# Patient Record
Sex: FEMALE | Race: WHITE | Employment: OTHER | URBAN - METROPOLITAN AREA
[De-identification: names, ages, dates, MRNs, and addresses within clinical notes are randomized per-mention and may not be internally consistent; named-entity substitution may affect disease eponyms.]

---

## 2017-04-10 ENCOUNTER — HOSPITAL ENCOUNTER (OUTPATIENT)
Dept: MAMMOGRAPHY | Age: 82
Discharge: HOME | End: 2017-04-10
Attending: INTERNAL MEDICINE
Payer: MEDICARE

## 2017-04-10 DIAGNOSIS — Z12.31 VISIT FOR SCREENING MAMMOGRAM: ICD-10-CM

## 2017-04-10 PROCEDURE — 77067 SCR MAMMO BI INCL CAD: CPT

## 2017-07-19 ENCOUNTER — HOSPITAL ENCOUNTER (OUTPATIENT)
Dept: GENERAL RADIOLOGY | Age: 82
Discharge: HOME | End: 2017-07-19
Attending: INTERNAL MEDICINE
Payer: MEDICARE

## 2017-07-19 DIAGNOSIS — M25.551 RIGHT HIP PAIN: ICD-10-CM

## 2017-07-19 PROCEDURE — 73502 X-RAY EXAM HIP UNI 2-3 VIEWS: CPT

## 2018-04-11 ENCOUNTER — HOSPITAL ENCOUNTER (OUTPATIENT)
Dept: MAMMOGRAPHY | Age: 83
Discharge: HOME | End: 2018-04-11
Attending: INTERNAL MEDICINE
Payer: MEDICARE

## 2018-04-11 DIAGNOSIS — Z12.39 SCREENING BREAST EXAMINATION: ICD-10-CM

## 2018-04-11 PROCEDURE — 77067 SCR MAMMO BI INCL CAD: CPT

## 2018-11-15 ENCOUNTER — HOSPITAL ENCOUNTER (OUTPATIENT)
Dept: ULTRASOUND IMAGING | Age: 83
Discharge: HOME | End: 2018-11-15
Attending: INTERNAL MEDICINE
Payer: MEDICARE

## 2018-11-15 ENCOUNTER — HOSPITAL ENCOUNTER (OUTPATIENT)
Dept: VASCULAR SURGERY | Age: 83
Discharge: HOME | End: 2018-11-15
Attending: INTERNAL MEDICINE
Payer: MEDICARE

## 2018-11-15 DIAGNOSIS — I10 HTN (HYPERTENSION): ICD-10-CM

## 2018-11-15 DIAGNOSIS — I12.9 RENAL HYPERTENSION: ICD-10-CM

## 2018-11-15 PROCEDURE — 93975 VASCULAR STUDY: CPT

## 2018-11-15 PROCEDURE — 76770 US EXAM ABDO BACK WALL COMP: CPT

## 2018-11-15 NOTE — PROCEDURES
[address]  *** FINAL REPORT ***    Name: Kelsey Nguyen  MRN: [id number]    Outpatient  : 08 May 1924  HIS Order #: [id number]  [id number] [facility] Visit #: [id number]  Date: 15 Nov 2018    TYPE OF TEST: Visceral Arterial Duplex    REASON FOR TEST  Eval for renal vascular HTN    Aortic PSV: 108.3 cm/s  Diameter AP: 1.5 cm   TV: 1.5 cm                   Right          Left  Renal Artery:- -------------  -------------  Proximal  PSV:  Mid       PSV:  Distal    PSV:  Aortic ratio :    Medullary PSV:            EDV:            EDR:            SDR:    Cortical  PSV:            EDV:            EDR:            SDR:  Stenosis:  Kidney size:    9.1 cm             cm               x  4.3 cm      x      cm    Hilar:-        Right          Left  Acc. Time  AT:     secs           secs  Acc. Index AI:             RI: 0.71    Mesenteric:-                  Prox   Mid   Dist Ratio Stenosis          Aneurysm                  ----- ----- ----- ----- ----------------- ------------  SMA:  Celiac:  Hepatic:  Splenic:  LUPILLO:  :    INTERPRETATION/FINDINGS  PROCEDURE: Evaluation of renal arteries with ultrasound (B-mode  imaging, pulsed Doppler, color Doppler). Includes the aorta, celiac  artery, superior mesenteric artery, renal arteries, segmental  arteries, and renal vein. IMPRESSION: Unable to visualize the left kidney due to bowel gas. Technically difficult study due to patient body habitus and bowel gas. 1. Note: High aortic PSV invalidates the use of RAR as an indicator of   renal stenosis. RENAL:  1. The right kidney measures 9.1 cm. MESENTERIC: Unable to visualzie the Celiac or SMA due to bowel gas and   patient body habitus. ADDITIONAL COMMENTS    I have personally reviewed the data relevant to the interpretation of  this  study. TECHNOLOGIST: Percy Trinh RVT  Signed: 11/15/2018 05:51 PM    PHYSICIAN: Sarah Jorge.  Phi Farah MD  Signed: 2018 08:23 AM

## 2019-04-03 ENCOUNTER — APPOINTMENT (OUTPATIENT)
Dept: CT IMAGING | Age: 84
DRG: 391 | End: 2019-04-03
Attending: STUDENT IN AN ORGANIZED HEALTH CARE EDUCATION/TRAINING PROGRAM
Payer: MEDICARE

## 2019-04-03 ENCOUNTER — HOSPITAL ENCOUNTER (INPATIENT)
Age: 84
LOS: 6 days | Discharge: NURSING, REHAB OR LONG-TERM CARE | DRG: 391 | End: 2019-04-09
Attending: STUDENT IN AN ORGANIZED HEALTH CARE EDUCATION/TRAINING PROGRAM | Admitting: FAMILY MEDICINE
Payer: MEDICARE

## 2019-04-03 DIAGNOSIS — E86.0 DEHYDRATION: ICD-10-CM

## 2019-04-03 DIAGNOSIS — R10.84 ABDOMINAL PAIN, GENERALIZED: ICD-10-CM

## 2019-04-03 DIAGNOSIS — I10 ESSENTIAL HYPERTENSION: ICD-10-CM

## 2019-04-03 DIAGNOSIS — R60.9 EDEMA, UNSPECIFIED TYPE: ICD-10-CM

## 2019-04-03 DIAGNOSIS — K56.7 ILEUS (HCC): Primary | ICD-10-CM

## 2019-04-03 PROBLEM — R10.9 ABDOMINAL PAIN: Status: ACTIVE | Noted: 2019-04-03

## 2019-04-03 PROBLEM — R10.13 ABDOMINAL PAIN, EPIGASTRIC: Status: ACTIVE | Noted: 2019-04-03

## 2019-04-03 LAB
ALBUMIN SERPL-MCNC: 3.3 G/DL (ref 3.5–5)
ALBUMIN/GLOB SERPL: 1.1 {RATIO} (ref 1.1–2.2)
ALP SERPL-CCNC: 122 U/L (ref 45–117)
ALT SERPL-CCNC: 15 U/L (ref 12–78)
ANION GAP SERPL CALC-SCNC: 9 MMOL/L (ref 5–15)
APPEARANCE UR: CLEAR
AST SERPL-CCNC: 22 U/L (ref 15–37)
BACTERIA URNS QL MICRO: NEGATIVE /HPF
BASOPHILS # BLD: 0.1 K/UL (ref 0–0.1)
BASOPHILS NFR BLD: 1 % (ref 0–1)
BILIRUB SERPL-MCNC: 0.4 MG/DL (ref 0.2–1)
BILIRUB UR QL: NEGATIVE
BUN SERPL-MCNC: 27 MG/DL (ref 6–20)
BUN/CREAT SERPL: 30 (ref 12–20)
CALCIUM SERPL-MCNC: 8.8 MG/DL (ref 8.5–10.1)
CHLORIDE SERPL-SCNC: 107 MMOL/L (ref 97–108)
CO2 SERPL-SCNC: 26 MMOL/L (ref 21–32)
COLOR UR: ABNORMAL
COMMENT, HOLDF: NORMAL
CREAT SERPL-MCNC: 0.89 MG/DL (ref 0.55–1.02)
DIFFERENTIAL METHOD BLD: ABNORMAL
EOSINOPHIL # BLD: 0 K/UL (ref 0–0.4)
EOSINOPHIL NFR BLD: 0 % (ref 0–7)
EPITH CASTS URNS QL MICRO: ABNORMAL /LPF
ERYTHROCYTE [DISTWIDTH] IN BLOOD BY AUTOMATED COUNT: 16.3 % (ref 11.5–14.5)
GLOBULIN SER CALC-MCNC: 3.1 G/DL (ref 2–4)
GLUCOSE SERPL-MCNC: 114 MG/DL (ref 65–100)
GLUCOSE UR STRIP.AUTO-MCNC: NEGATIVE MG/DL
HCT VFR BLD AUTO: 30.6 % (ref 35–47)
HGB BLD-MCNC: 9.3 G/DL (ref 11.5–16)
HGB UR QL STRIP: NEGATIVE
HYALINE CASTS URNS QL MICRO: ABNORMAL /LPF (ref 0–5)
IMM GRANULOCYTES # BLD AUTO: 0 K/UL (ref 0–0.04)
IMM GRANULOCYTES NFR BLD AUTO: 0 % (ref 0–0.5)
KETONES UR QL STRIP.AUTO: 15 MG/DL
LACTATE SERPL-SCNC: 0.8 MMOL/L (ref 0.4–2)
LEUKOCYTE ESTERASE UR QL STRIP.AUTO: NEGATIVE
LIPASE SERPL-CCNC: 112 U/L (ref 73–393)
LYMPHOCYTES # BLD: 0.7 K/UL (ref 0.8–3.5)
LYMPHOCYTES NFR BLD: 7 % (ref 12–49)
MAGNESIUM SERPL-MCNC: 1.9 MG/DL (ref 1.6–2.4)
MCH RBC QN AUTO: 25.7 PG (ref 26–34)
MCHC RBC AUTO-ENTMCNC: 30.4 G/DL (ref 30–36.5)
MCV RBC AUTO: 84.5 FL (ref 80–99)
MONOCYTES # BLD: 0.3 K/UL (ref 0–1)
MONOCYTES NFR BLD: 3 % (ref 5–13)
NEUTS SEG # BLD: 8.8 K/UL (ref 1.8–8)
NEUTS SEG NFR BLD: 89 % (ref 32–75)
NITRITE UR QL STRIP.AUTO: NEGATIVE
NRBC # BLD: 0 K/UL (ref 0–0.01)
NRBC BLD-RTO: 0 PER 100 WBC
PH UR STRIP: 5.5 [PH] (ref 5–8)
PLATELET # BLD AUTO: 247 K/UL (ref 150–400)
PMV BLD AUTO: 10.4 FL (ref 8.9–12.9)
POTASSIUM SERPL-SCNC: 3.3 MMOL/L (ref 3.5–5.1)
PROT SERPL-MCNC: 6.4 G/DL (ref 6.4–8.2)
PROT UR STRIP-MCNC: 100 MG/DL
RBC # BLD AUTO: 3.62 M/UL (ref 3.8–5.2)
RBC #/AREA URNS HPF: ABNORMAL /HPF (ref 0–5)
RBC MORPH BLD: ABNORMAL
SAMPLES BEING HELD,HOLD: NORMAL
SODIUM SERPL-SCNC: 142 MMOL/L (ref 136–145)
SP GR UR REFRACTOMETRY: 1.02 (ref 1–1.03)
UROBILINOGEN UR QL STRIP.AUTO: 0.2 EU/DL (ref 0.2–1)
WBC # BLD AUTO: 9.9 K/UL (ref 3.6–11)
WBC URNS QL MICRO: ABNORMAL /HPF (ref 0–4)

## 2019-04-03 PROCEDURE — 83605 ASSAY OF LACTIC ACID: CPT

## 2019-04-03 PROCEDURE — 99285 EMERGENCY DEPT VISIT HI MDM: CPT

## 2019-04-03 PROCEDURE — 65270000032 HC RM SEMIPRIVATE

## 2019-04-03 PROCEDURE — 81001 URINALYSIS AUTO W/SCOPE: CPT

## 2019-04-03 PROCEDURE — 74011000250 HC RX REV CODE- 250: Performed by: STUDENT IN AN ORGANIZED HEALTH CARE EDUCATION/TRAINING PROGRAM

## 2019-04-03 PROCEDURE — 96374 THER/PROPH/DIAG INJ IV PUSH: CPT

## 2019-04-03 PROCEDURE — 83735 ASSAY OF MAGNESIUM: CPT

## 2019-04-03 PROCEDURE — 96375 TX/PRO/DX INJ NEW DRUG ADDON: CPT

## 2019-04-03 PROCEDURE — 96361 HYDRATE IV INFUSION ADD-ON: CPT

## 2019-04-03 PROCEDURE — 74011000258 HC RX REV CODE- 258: Performed by: RADIOLOGY

## 2019-04-03 PROCEDURE — 80053 COMPREHEN METABOLIC PANEL: CPT

## 2019-04-03 PROCEDURE — 83690 ASSAY OF LIPASE: CPT

## 2019-04-03 PROCEDURE — 51701 INSERT BLADDER CATHETER: CPT

## 2019-04-03 PROCEDURE — 74011636320 HC RX REV CODE- 636/320: Performed by: RADIOLOGY

## 2019-04-03 PROCEDURE — 36415 COLL VENOUS BLD VENIPUNCTURE: CPT

## 2019-04-03 PROCEDURE — 74011250637 HC RX REV CODE- 250/637: Performed by: FAMILY MEDICINE

## 2019-04-03 PROCEDURE — 85025 COMPLETE CBC W/AUTO DIFF WBC: CPT

## 2019-04-03 PROCEDURE — 74011250636 HC RX REV CODE- 250/636: Performed by: STUDENT IN AN ORGANIZED HEALTH CARE EDUCATION/TRAINING PROGRAM

## 2019-04-03 PROCEDURE — 74177 CT ABD & PELVIS W/CONTRAST: CPT

## 2019-04-03 PROCEDURE — 74011000258 HC RX REV CODE- 258: Performed by: FAMILY MEDICINE

## 2019-04-03 PROCEDURE — 74011250637 HC RX REV CODE- 250/637: Performed by: STUDENT IN AN ORGANIZED HEALTH CARE EDUCATION/TRAINING PROGRAM

## 2019-04-03 PROCEDURE — 77030005563 HC CATH URETH INT MMGH -A

## 2019-04-03 PROCEDURE — 74011250636 HC RX REV CODE- 250/636: Performed by: FAMILY MEDICINE

## 2019-04-03 RX ORDER — SODIUM CHLORIDE 0.9 % (FLUSH) 0.9 %
5-40 SYRINGE (ML) INJECTION EVERY 8 HOURS
Status: DISCONTINUED | OUTPATIENT
Start: 2019-04-03 | End: 2019-04-09 | Stop reason: HOSPADM

## 2019-04-03 RX ORDER — MORPHINE SULFATE 2 MG/ML
2 INJECTION, SOLUTION INTRAMUSCULAR; INTRAVENOUS
Status: COMPLETED | OUTPATIENT
Start: 2019-04-03 | End: 2019-04-03

## 2019-04-03 RX ORDER — ACETAMINOPHEN 325 MG/1
650 TABLET ORAL ONCE
Status: COMPLETED | OUTPATIENT
Start: 2019-04-03 | End: 2019-04-03

## 2019-04-03 RX ORDER — LEVOTHYROXINE SODIUM 50 UG/1
50 TABLET ORAL
Status: DISCONTINUED | OUTPATIENT
Start: 2019-04-04 | End: 2019-04-09 | Stop reason: HOSPADM

## 2019-04-03 RX ORDER — TEMAZEPAM 15 MG/1
15 CAPSULE ORAL
Status: DISCONTINUED | OUTPATIENT
Start: 2019-04-03 | End: 2019-04-09 | Stop reason: HOSPADM

## 2019-04-03 RX ORDER — SODIUM CHLORIDE 9 MG/ML
50 INJECTION, SOLUTION INTRAVENOUS CONTINUOUS
Status: DISCONTINUED | OUTPATIENT
Start: 2019-04-03 | End: 2019-04-05

## 2019-04-03 RX ORDER — GUAIFENESIN 100 MG/5ML
81 LIQUID (ML) ORAL DAILY
Status: DISCONTINUED | OUTPATIENT
Start: 2019-04-04 | End: 2019-04-09 | Stop reason: HOSPADM

## 2019-04-03 RX ORDER — PANTOPRAZOLE SODIUM 40 MG/1
40 TABLET, DELAYED RELEASE ORAL
Status: DISCONTINUED | OUTPATIENT
Start: 2019-04-04 | End: 2019-04-09 | Stop reason: HOSPADM

## 2019-04-03 RX ORDER — ESCITALOPRAM OXALATE 10 MG/1
10 TABLET ORAL DAILY
Status: DISCONTINUED | OUTPATIENT
Start: 2019-04-04 | End: 2019-04-09 | Stop reason: HOSPADM

## 2019-04-03 RX ORDER — SODIUM CHLORIDE 0.9 % (FLUSH) 0.9 %
10 SYRINGE (ML) INJECTION
Status: COMPLETED | OUTPATIENT
Start: 2019-04-03 | End: 2019-04-03

## 2019-04-03 RX ORDER — ATORVASTATIN CALCIUM 40 MG/1
40 TABLET, FILM COATED ORAL
Status: DISCONTINUED | OUTPATIENT
Start: 2019-04-03 | End: 2019-04-09 | Stop reason: HOSPADM

## 2019-04-03 RX ORDER — LOSARTAN POTASSIUM 50 MG/1
100 TABLET ORAL DAILY
Status: DISCONTINUED | OUTPATIENT
Start: 2019-04-03 | End: 2019-04-09 | Stop reason: HOSPADM

## 2019-04-03 RX ORDER — OXYBUTYNIN CHLORIDE 5 MG/1
5 TABLET ORAL
Status: DISCONTINUED | OUTPATIENT
Start: 2019-04-03 | End: 2019-04-09 | Stop reason: HOSPADM

## 2019-04-03 RX ORDER — CLONIDINE 0.1 MG/24H
1 PATCH, EXTENDED RELEASE TRANSDERMAL
Status: DISCONTINUED | OUTPATIENT
Start: 2019-04-03 | End: 2019-04-04

## 2019-04-03 RX ORDER — ONDANSETRON 2 MG/ML
4 INJECTION INTRAMUSCULAR; INTRAVENOUS
Status: COMPLETED | OUTPATIENT
Start: 2019-04-03 | End: 2019-04-03

## 2019-04-03 RX ORDER — POTASSIUM CHLORIDE 750 MG/1
20 TABLET, FILM COATED, EXTENDED RELEASE ORAL
Status: COMPLETED | OUTPATIENT
Start: 2019-04-03 | End: 2019-04-03

## 2019-04-03 RX ORDER — SODIUM CHLORIDE 0.9 % (FLUSH) 0.9 %
5-40 SYRINGE (ML) INJECTION AS NEEDED
Status: DISCONTINUED | OUTPATIENT
Start: 2019-04-03 | End: 2019-04-09 | Stop reason: HOSPADM

## 2019-04-03 RX ADMIN — POTASSIUM CHLORIDE 20 MEQ: 750 TABLET, EXTENDED RELEASE ORAL at 19:48

## 2019-04-03 RX ADMIN — SODIUM CHLORIDE 50 ML/HR: 900 INJECTION, SOLUTION INTRAVENOUS at 19:35

## 2019-04-03 RX ADMIN — FAMOTIDINE 20 MG: 10 INJECTION, SOLUTION INTRAVENOUS at 10:46

## 2019-04-03 RX ADMIN — Medication 10 ML: at 21:29

## 2019-04-03 RX ADMIN — IOPAMIDOL 100 ML: 755 INJECTION, SOLUTION INTRAVENOUS at 12:28

## 2019-04-03 RX ADMIN — LOSARTAN POTASSIUM 100 MG: 50 TABLET ORAL at 18:23

## 2019-04-03 RX ADMIN — SODIUM CHLORIDE 100 ML: 900 INJECTION, SOLUTION INTRAVENOUS at 12:29

## 2019-04-03 RX ADMIN — CEFTRIAXONE 1 G: 1 INJECTION, POWDER, FOR SOLUTION INTRAMUSCULAR; INTRAVENOUS at 19:35

## 2019-04-03 RX ADMIN — SODIUM CHLORIDE 1000 ML: 900 INJECTION, SOLUTION INTRAVENOUS at 10:46

## 2019-04-03 RX ADMIN — OXYBUTYNIN CHLORIDE 5 MG: 5 TABLET ORAL at 21:28

## 2019-04-03 RX ADMIN — ATORVASTATIN CALCIUM 40 MG: 40 TABLET, FILM COATED ORAL at 21:29

## 2019-04-03 RX ADMIN — MORPHINE SULFATE 2 MG: 2 INJECTION, SOLUTION INTRAMUSCULAR; INTRAVENOUS at 13:21

## 2019-04-03 RX ADMIN — Medication 10 ML: at 12:28

## 2019-04-03 RX ADMIN — ONDANSETRON 4 MG: 2 INJECTION INTRAMUSCULAR; INTRAVENOUS at 10:46

## 2019-04-03 RX ADMIN — TEMAZEPAM 15 MG: 15 CAPSULE ORAL at 21:28

## 2019-04-03 RX ADMIN — ACETAMINOPHEN 650 MG: 325 TABLET ORAL at 10:45

## 2019-04-03 NOTE — ED NOTES
Patient reports decreased pain. Resting on stretcher with eyes closed, updated on plan of care for admission. Call bell within reach.

## 2019-04-03 NOTE — ED NOTES
Patient transported to floor via stretcher by transport team. Patient in stable condition, alert and oriented.

## 2019-04-03 NOTE — ED NOTES
Patient resting with eyes closed on stretcher in NAD. Patient's friend updated on plan of care and CT results. Awaiting Dr Marylene Codding for admission

## 2019-04-03 NOTE — ED TRIAGE NOTES
Pt arrives via ems from [address] living c/o abdominal pain since 7am this morning that woke her from her sleep. +nausea. appendix and gallbladder already removed. History HTN. 024M systolic per EMS. EMS reports patient has a medication patch on for HTN. DNR (papers at bedside) pt walks with cane.

## 2019-04-03 NOTE — PROGRESS NOTES
Admission Medication Reconciliation: 
Information obtained from: Patient/patient's visitor, medication lists in room (not updated per patient), Rx Query Significant PMH/Disease States:  
Past Medical History:  
Diagnosis Date Arthritis HANDS Chronic pain UPPER ABDOMEN Elevated lipids 5/31/2011 Gastrointestinal disease Acid reflux Gastrointestinal disorder Hiatial hernia GERD (gastroesophageal reflux disease) 5/31/2011 HTN (hypertension) 5/31/2011 Hypothyroidism 2/11/2013 Macular degeneration Pneumonia Post herpetic neuralgia 5/31/2011 PVD (peripheral vascular disease) ([facility] [address] 03/2000 Thromboembolus ([facility] [address] 2001 LT. LEG Thyroid disease Unspecified adverse effect of anesthesia DIFFICULTY AWAKENING, WAS COMBATIVE Chief Complaint for this Admission: Chief Complaint Patient presents with Abdominal Pain Allergies:  Bactrim [sulfamethoprim ds]; Darvocet a500 [propoxyphene n-acetaminophen]; Other medication; and Pcn [penicillins] Prior to Admission Medications:  
Prior to Admission Medications Prescriptions Last Dose Informant Patient Reported? Taking? FOLIC ACID PO Unknown at Unknown time  Yes No  
Sig: Take 400 mg by mouth. amLODIPine (NORVASC) 2.5 mg tablet Unknown at Unknown time  No No  
Sig: Take 1 Tab by mouth daily. aspirin 81 mg chewable tablet  Self Yes Yes Sig: Take 81 mg by mouth daily. atorvastatin (LIPITOR) 40 mg tablet   No Yes Sig: TAKE ONE (1) TABLET BY MOUTH AT BEDTIME.  
cloNIDine (CATAPRES) 0.1 mg/24 hr ptwk   No Yes Sig: APPLY 1 PATCH TOPICALLY. CHANGE WEEKLY  
escitalopram oxalate (LEXAPRO) 10 mg tablet   No Yes Sig: TAKE ONE (1) TABLET BY MOUTH EVERY DAY. .  
hydroCHLOROthiazide (MICROZIDE) 12.5 mg capsule Unknown at Unknown time  No No  
Sig: TAKE ONE (1) CAPSULE BY MOUTH EVERY DAY. levothyroxine (SYNTHROID) 50 mcg tablet   No Yes Sig: Take 1 Tab by mouth Daily (before breakfast). losartan (COZAAR) 100 mg tablet   Yes Yes Sig: Take 100 mg by mouth daily. omeprazole (PRILOSEC) 40 mg capsule   Yes Yes Sig: Take 40 mg by mouth daily. oxybutynin (DITROPAN) 5 mg tablet   No Yes Sig: TAKE ONE (1) TABLET BY MOUTH AT BEDTIME. temazepam (RESTORIL) 15 mg capsule   No Yes Sig: TAKE ONE (1) CAPSULE BY MOUTH AT BEDTIME AS NEEDED FOR INSOMNIA.  
vit A/vit C/vit E/zinc/copper (PRESERVISION AREDS PO) Unknown at Unknown time  Yes No  
Sig: Take  by mouth daily. Facility-Administered Medications: None Comments/Recommendations:  
 
Spoke with patient and patient's visitor regarding patient's allergies and PTA medications. Patient is from [facility]. There were several medication lists in the room, however the patient reports they have not been updated recently. Used information from Rx Query to assist with updating PTA medication list. 
 
1) Reviewed and confirmed allergies. 2) Reviewed and confirmed PTA list. Of note, unable to confirm folic acid and preservision areds as patient was unsure and medications were not listed on lists in room or in Rx Query. Also, amlodipine and HCTZ were marked as \"unknown\" because both medications were filled last on 10/8/18 for 30 day supplies per Rx Query. Unable to confirm if patient is still taking these medications at this time. All other medications were filled recently or confirmed by patient/medication lists. Patient's visitor plans to go back to patient's apartment to see if she had her morning medications. Efren Scott, PharmD

## 2019-04-03 NOTE — ED NOTES
Patient resting on stretcher with eyes closed awaiting CT scan. RN called CT to check status, will be coming to transport patient shortly.

## 2019-04-03 NOTE — ED NOTES
Daughter at bedside. Updated on patient's status and plan of care. Daughter to wait in ER waiting room until results.

## 2019-04-03 NOTE — ED PROVIDER NOTES
80 y.o. female with past medical history significant for GERD, HTN, PVD, Thyroid disease, and Hypothyroidism who presents from [facility] via EMS with chief complaint of abdominal pain. Pt woke up this morning at 0700 with lower abdominal pain and nausea. Pt reports accompanying generalized weakness and fatigue. Patient reports h/o appendectomy and cholecystectomy and hiatal hernia repair. Pt endorses history of HTN. Pt denies diarrhea, vomiting, urinary frequency, dysuria, fever, or HA. There are no other acute medical concerns at this time. Social hx: Former smoker (Quit: 1952), Uses EtOH (1 glasses of wine and 1 shot of liquor/week) PCP: Angelica Toledo MD 
 
Note written by Yordan Leblanc, as dictated by Ji Ellison MD 10:15 AM 
 
The history is provided by the patient. No  was used. Past Medical History:  
Diagnosis Date  Arthritis HANDS  Chronic pain UPPER ABDOMEN  
 Elevated lipids 5/31/2011  Gastrointestinal disease Acid reflux  Gastrointestinal disorder Hiatial hernia  GERD (gastroesophageal reflux disease) 5/31/2011  
 HTN (hypertension) 5/31/2011  Hypothyroidism 2/11/2013  Macular degeneration  Pneumonia  Post herpetic neuralgia 5/31/2011  PVD (peripheral vascular disease) ([facility] [address] 03/2000  Thromboembolus ([facility] [address] 2001 LT. LEG  Thyroid disease  Unspecified adverse effect of anesthesia DIFFICULTY AWAKENING, WAS COMBATIVE Past Surgical History:  
Procedure Laterality Date  HX APPENDECTOMY  HX CARPAL TUNNEL RELEASE  3/2000  HX CATARACT REMOVAL    
 WILFREDO. W/ LENS IMPLANT  HX CHOLECYSTECTOMY  HX GI    
 COLONOSCOPY AND ENDOSCOPY  
 HX GI  12/4/12 DaVinci Hiatal Hernia Repair with Mesh and Toupet Fundoplication  HX MOHS PROCEDURES    
 LT.  
 HX ORTHOPAEDIC    
 WILFREDO. CARPAL TUNNEL  
 HX TONSILLECTOMY Family History: Problem Relation Age of Onset  Heart Disease Brother   
      age 63's  Cancer Brother LUNG  
 Heart Disease Brother   
      age 66's Social History Socioeconomic History  Marital status:  Spouse name: Not on file  Number of children: Not on file  Years of education: Not on file  Highest education level: Not on file Occupational History  Not on file Social Needs  Financial resource strain: Not on file  Food insecurity:  
  Worry: Not on file Inability: Not on file  Transportation needs:  
  Medical: Not on file Non-medical: Not on file Tobacco Use  Smoking status: Former Smoker Packs/day: 0.50 Years: 7.00 Pack years: 3.50 Last attempt to quit: 1952 Years since quittin.2  Smokeless tobacco: Never Used Substance and Sexual Activity  Alcohol use: Yes Alcohol/week: 1.0 oz Types: 1 Glasses of wine, 1 Shots of liquor per week  Drug use: No  
 Sexual activity: Not on file Lifestyle  Physical activity:  
  Days per week: Not on file Minutes per session: Not on file  Stress: Not on file Relationships  Social connections:  
  Talks on phone: Not on file Gets together: Not on file Attends Yarsanism service: Not on file Active member of club or organization: Not on file Attends meetings of clubs or organizations: Not on file Relationship status: Not on file  Intimate partner violence:  
  Fear of current or ex partner: Not on file Emotionally abused: Not on file Physically abused: Not on file Forced sexual activity: Not on file Other Topics Concern  Not on file Social History Narrative  Not on file ALLERGIES: Bactrim [sulfamethoprim ds]; Darvocet a500 [propoxyphene n-acetaminophen]; Other medication; and Pcn [penicillins] Review of Systems Constitutional: Positive for fatigue. Negative for fever. Eyes: Negative for photophobia. Respiratory: Negative for shortness of breath. Cardiovascular: Negative for chest pain. Gastrointestinal: Positive for abdominal pain and nausea. Negative for diarrhea and vomiting. Genitourinary: Negative for dysuria and frequency. Musculoskeletal: Negative for back pain. Neurological: Positive for weakness (Generalized). Negative for headaches. Psychiatric/Behavioral: Negative for confusion. All other systems reviewed and are negative. There were no vitals filed for this visit. Physical Exam  
Constitutional: She appears well-nourished. No distress. Frail and elderly appearing. HENT:  
Head: Normocephalic. Mouth/Throat: Mucous membranes are dry. Dry mucous membranes Eyes: Pupils are equal, round, and reactive to light. Cardiovascular: Normal rate, regular rhythm and intact distal pulses. Pulmonary/Chest: Effort normal and breath sounds normal.  
Membrane lungs clear Abdominal: She exhibits no distension. There is tenderness in the right lower quadrant, suprapubic area and left lower quadrant. There is no rebound and no guarding. Abdomen is tender in suprapubic and bilateral lower quadrants; No rebound, no guarding. Neurological: She is alert. Skin: Skin is warm. Poor skin turgor. Psychiatric: Her behavior is normal.  
 
Note written by Yordan Chapman, as dictated by Michell Kumari MD 10:15 AM 
 
MDM Number of Diagnoses or Management Options Abdominal pain, generalized:  
Dehydration:  
Ileus [facility]):  
Diagnosis management comments: Marina Hurst is a 80 y.o. female presenting with lower abdominal pain and tenderness, nauea without vomiting. Differential includes sbo, ileus, diverticulitis, colitis Course: improved, given IVF, morphine, antacid. Dispo: admit for obs, hydration, serial abdominal exams. Procedures CONSULT NOTE: 
2:08 PM Michell Kumari MD spoke with Dr. Marylene Codding, Consult for PCP. Discussed available diagnostic tests and clinical findings. Dr. David Vogt will see and admit the patient.

## 2019-04-03 NOTE — H&P
History and Physical 
 
Subjective: HPI Davin Mehta is a 80 y.o.  female who presents with abdominal pain, bloating, and lack of appetite. Has not thrown up but is S/P Nissen Fundoplication and doesn't think she can throw up. Has had issues with weight loss ( down 8 lbs in the last 2 months. ). Past Medical History:  
Diagnosis Date  Arthritis HANDS  Chronic pain UPPER ABDOMEN  
 Elevated lipids 2011  Gastrointestinal disease Acid reflux  Gastrointestinal disorder Hiatial hernia  GERD (gastroesophageal reflux disease) 2011  
 HTN (hypertension) 2011  Hypothyroidism 2013  Macular degeneration  Pneumonia  Post herpetic neuralgia 2011  PVD (peripheral vascular disease) ([facility] [address] 2000  Thromboembolus ([facility] [address]  LT. LEG  Thyroid disease  Unspecified adverse effect of anesthesia DIFFICULTY AWAKENING, WAS COMBATIVE Past Surgical History:  
Procedure Laterality Date  HX APPENDECTOMY  HX CARPAL TUNNEL RELEASE  3/2000  HX CATARACT REMOVAL    
 WILFREDO. W/ LENS IMPLANT  HX CHOLECYSTECTOMY  HX GI    
 COLONOSCOPY AND ENDOSCOPY  
 HX GI  12 DaVinci Hiatal Hernia Repair with Mesh and Toupet Fundoplication  HX MOHS PROCEDURES    
 LT.  
 HX ORTHOPAEDIC    
 WILFREDO. CARPAL TUNNEL  
 HX TONSILLECTOMY Family History Problem Relation Age of Onset  Heart Disease Brother   
      age 63's  Cancer Brother LUNG  
 Heart Disease Brother   
      age 66's Social History Tobacco Use  Smoking status: Former Smoker Packs/day: 0.50 Years: 7.00 Pack years: 3.50 Last attempt to quit: 1952 Years since quittin.2  Smokeless tobacco: Never Used Substance Use Topics  Alcohol use: Yes Alcohol/week: 1.0 oz Types: 1 Glasses of wine, 1 Shots of liquor per week Prior to Admission medications Medication Sig Start Date End Date Taking? Authorizing Provider  
cloNIDine (CATAPRES) 0.1 mg/24 hr ptwk APPLY 1 PATCH TOPICALLY. CHANGE WEEKLY 1/14/19  Yes Angelica Toledo MD  
levothyroxine (SYNTHROID) 50 mcg tablet Take 1 Tab by mouth Daily (before breakfast). 11/27/18  Yes Angelica Toledo MD  
temazepam (RESTORIL) 15 mg capsule TAKE ONE (1) CAPSULE BY MOUTH AT BEDTIME AS NEEDED FOR INSOMNIA. 11/20/18  Yes Angelica Toledo MD  
losartan (COZAAR) 100 mg tablet Take 100 mg by mouth daily. Yes Provider, Historical  
omeprazole (PRILOSEC) 40 mg capsule Take 40 mg by mouth daily. Yes Provider, Historical  
atorvastatin (LIPITOR) 40 mg tablet TAKE ONE (1) TABLET BY MOUTH AT BEDTIME. 8/8/18  Yes Angelica Toledo MD  
escitalopram oxalate (LEXAPRO) 10 mg tablet TAKE ONE (1) TABLET BY MOUTH EVERY DAY. . 7/30/18  Yes Angelica Toledo MD  
oxybutynin (DITROPAN) 5 mg tablet TAKE ONE (1) TABLET BY MOUTH AT BEDTIME. 7/25/18  Yes Angelica Toledo MD  
aspirin 81 mg chewable tablet Take 81 mg by mouth daily. Yes Provider, Historical  
vit A/vit C/vit E/zinc/copper (PRESERVISION AREDS PO) Take  by mouth daily. Provider, Historical  
amLODIPine (NORVASC) 2.5 mg tablet Take 1 Tab by mouth daily. 10/8/18   Angelica Toledo MD  
hydroCHLOROthiazide (MICROZIDE) 12.5 mg capsule TAKE ONE (1) CAPSULE BY MOUTH EVERY DAY. 4/8/18   Angelica Toledo MD  
FOLIC ACID PO Take 292 mg by mouth. Other, MD Oscar  
 
Allergies Allergen Reactions  Bactrim [Sulfamethoprim Ds] Other (comments)  Darvocet A500 [Propoxyphene N-Acetaminophen] Nausea and Vomiting  Other Medication Rash  
  p-phenylenediamine  Pcn [Penicillins] Unknown (comments) Health Maintenance Topic Date Due  Shingrix Vaccine Age 50> (1 of 2) 05/08/1974  GLAUCOMA SCREENING Q2Y  05/08/1989  Bone Densitometry (Dexa) Screening  05/08/1989  Pneumococcal 65+ years (1 of 2 - PCV13) 05/03/2006  DTaP/Tdap/Td series (1 - Tdap) 01/02/2008  Influenza Age 5 to Adult  08/01/2019  MEDICARE YEARLY EXAM  10/09/2019 Review of Systems: A comprehensive review of systems was negative except for that written in the History of Present Illness. Objective: Intake and Output:   
04/03 0701 - 04/03 1900 In: -  
Out: 100 [Urine:100] No intake/output data recorded. Physical Exam:  
Visit Vitals BP (!) 230/61 Pulse (!) 52 Temp 98.1 °F (36.7 °C) Resp 16 SpO2 93% Lungs: clear to auscultation bilaterally Heart: regular rate and rhythm, S1, S2 normal, no murmur, click, rub or gallop Abdomen: soft, non-tender. Bowel sounds normal. No masses,  no organomegaly Extremities: extremities normal, atraumatic, no cyanosis or edema Neurologic: Grossly normal 
 
 
 
Data Review:  
Recent Results (from the past 24 hour(s)) CBC WITH AUTOMATED DIFF Collection Time: 04/03/19 10:22 AM  
Result Value Ref Range WBC 9.9 3.6 - 11.0 K/uL  
 RBC 3.62 (L) 3.80 - 5.20 M/uL HGB 9.3 (L) 11.5 - 16.0 g/dL HCT 30.6 (L) 35.0 - 47.0 % MCV 84.5 80.0 - 99.0 FL  
 MCH 25.7 (L) 26.0 - 34.0 PG  
 MCHC 30.4 30.0 - 36.5 g/dL  
 RDW 16.3 (H) 11.5 - 14.5 % PLATELET 797 297 - 008 K/uL MPV 10.4 8.9 - 12.9 FL  
 NRBC 0.0 0  WBC ABSOLUTE NRBC 0.00 0.00 - 0.01 K/uL NEUTROPHILS 89 (H) 32 - 75 % LYMPHOCYTES 7 (L) 12 - 49 % MONOCYTES 3 (L) 5 - 13 % EOSINOPHILS 0 0 - 7 % BASOPHILS 1 0 - 1 % IMMATURE GRANULOCYTES 0 0.0 - 0.5 % ABS. NEUTROPHILS 8.8 (H) 1.8 - 8.0 K/UL  
 ABS. LYMPHOCYTES 0.7 (L) 0.8 - 3.5 K/UL  
 ABS. MONOCYTES 0.3 0.0 - 1.0 K/UL  
 ABS. EOSINOPHILS 0.0 0.0 - 0.4 K/UL  
 ABS. BASOPHILS 0.1 0.0 - 0.1 K/UL  
 ABS. IMM. GRANS. 0.0 0.00 - 0.04 K/UL  
 DF SMEAR SCANNED    
 RBC COMMENTS ANISOCYTOSIS 
1+ METABOLIC PANEL, COMPREHENSIVE Collection Time: 04/03/19 10:22 AM  
Result Value Ref Range Sodium 142 136 - 145 mmol/L Potassium 3.3 (L) 3.5 - 5.1 mmol/L Chloride 107 97 - 108 mmol/L  
 CO2 26 21 - 32 mmol/L Anion gap 9 5 - 15 mmol/L Glucose 114 (H) 65 - 100 mg/dL BUN 27 (H) 6 - 20 MG/DL Creatinine 0.89 0.55 - 1.02 MG/DL  
 BUN/Creatinine ratio 30 (H) 12 - 20 GFR est AA >60 >60 ml/min/1.73m2 GFR est non-AA 59 (L) >60 ml/min/1.73m2 Calcium 8.8 8.5 - 10.1 MG/DL Bilirubin, total 0.4 0.2 - 1.0 MG/DL  
 ALT (SGPT) 15 12 - 78 U/L  
 AST (SGOT) 22 15 - 37 U/L Alk. phosphatase 122 (H) 45 - 117 U/L Protein, total 6.4 6.4 - 8.2 g/dL Albumin 3.3 (L) 3.5 - 5.0 g/dL Globulin 3.1 2.0 - 4.0 g/dL A-G Ratio 1.1 1.1 - 2.2 LIPASE Collection Time: 04/03/19 10:22 AM  
Result Value Ref Range Lipase 112 73 - 393 U/L MAGNESIUM Collection Time: 04/03/19 10:22 AM  
Result Value Ref Range Magnesium 1.9 1.6 - 2.4 mg/dL LACTIC ACID Collection Time: 04/03/19 10:27 AM  
Result Value Ref Range Lactic acid 0.8 0.4 - 2.0 MMOL/L  
SAMPLES BEING HELD Collection Time: 04/03/19 10:27 AM  
Result Value Ref Range SAMPLES BEING HELD 1RED,1BLUE   
 COMMENT Add-on orders for these samples will be processed based on acceptable specimen integrity and analyte stability, which may vary by analyte. URINALYSIS W/ RFLX MICROSCOPIC Collection Time: 04/03/19  1:05 PM  
Result Value Ref Range Color YELLOW/STRAW Appearance CLEAR CLEAR Specific gravity 1.020 1.003 - 1.030    
 pH (UA) 5.5 5.0 - 8.0 Protein 100 (A) NEG mg/dL Glucose NEGATIVE  NEG mg/dL Ketone 15 (A) NEG mg/dL Bilirubin NEGATIVE  NEG Blood NEGATIVE  NEG Urobilinogen 0.2 0.2 - 1.0 EU/dL Nitrites NEGATIVE  NEG Leukocyte Esterase NEGATIVE  NEG    
 WBC 0-4 0 - 4 /hpf  
 RBC 0-5 0 - 5 /hpf Epithelial cells FEW FEW /lpf Bacteria NEGATIVE  NEG /hpf Hyaline cast 2-5 0 - 5 /lpf  
 
 
CT abd and pelvis- . Without definite wall thickening. 1. Bilateral pleural effusions, with bibasilar atelectasis, new since the prior 
study in 2016. 2. Moderate ascites, also new. 3. Diffuse small bowel dilatation without a discrete transition. 4. No direct evidence for acute diverticulitis or colitis. However, there is a 
focal segmental narrowing in the sigmoid colon which should be evaluated and 
correlated with colonoscopy. 5. Other incidental and postoperative changes. Assessment:  
 
Active Problems: 
  Abdominal pain, epigastric (4/3/2019) Abdominal pain (4/3/2019) Plan:  
 
1) IV Abx to cover for diverticulitis 2) GI consult- eval for pain and ascites 3) NPO for now- very gentle IVF 
4) echo- to screen for possible ascites etiology Signed By: Ambrosio Dyson MD   
 April 3, 2019

## 2019-04-03 NOTE — ED NOTES
Patient repositioned in bed and placed in a position of comfort. Socks placed on feet for comfort. Call bell within reach.

## 2019-04-03 NOTE — ROUTINE PROCESS
TRANSFER - OUT REPORT: 
 
Verbal report given to [employer] (name) on [address]  being transferred to Room 505 (unit) for routine progression of care Report consisted of patients Situation, Background, Assessment and  
Recommendations(SBAR). Information from the following report(s) SBAR, Kardex, ED Summary and MAR was reviewed with the receiving nurse. Lines:  
Peripheral IV 04/03/19 Right Forearm (Active) Site Assessment Clean, dry, & intact 4/3/2019 10:22 AM  
Phlebitis Assessment 0 4/3/2019 10:22 AM  
Infiltration Assessment 0 4/3/2019 10:22 AM  
Dressing Status Clean, dry, & intact 4/3/2019 10:22 AM  
Dressing Type Transparent 4/3/2019 10:22 AM  
Hub Color/Line Status Patent; Flushed;Capped;Pink 4/3/2019 10:22 AM  
Action Taken Blood drawn 4/3/2019 10:22 AM  
  
 
Opportunity for questions and clarification was provided. Patient transported with: 
 Android App Review Source

## 2019-04-04 ENCOUNTER — ANESTHESIA EVENT (OUTPATIENT)
Dept: ENDOSCOPY | Age: 84
DRG: 391 | End: 2019-04-04
Payer: MEDICARE

## 2019-04-04 ENCOUNTER — APPOINTMENT (OUTPATIENT)
Dept: NON INVASIVE DIAGNOSTICS | Age: 84
DRG: 391 | End: 2019-04-04
Attending: FAMILY MEDICINE
Payer: MEDICARE

## 2019-04-04 LAB
ANION GAP SERPL CALC-SCNC: 9 MMOL/L (ref 5–15)
BASOPHILS # BLD: 0 K/UL (ref 0–0.1)
BASOPHILS NFR BLD: 0 % (ref 0–1)
BUN SERPL-MCNC: 30 MG/DL (ref 6–20)
BUN/CREAT SERPL: 36 (ref 12–20)
CALCIUM SERPL-MCNC: 8.5 MG/DL (ref 8.5–10.1)
CHLORIDE SERPL-SCNC: 109 MMOL/L (ref 97–108)
CO2 SERPL-SCNC: 24 MMOL/L (ref 21–32)
CREAT SERPL-MCNC: 0.83 MG/DL (ref 0.55–1.02)
DIFFERENTIAL METHOD BLD: ABNORMAL
ECHO AV AREA PEAK VELOCITY: 2.4 CM2
ECHO AV PEAK GRADIENT: 7.6 MMHG
ECHO AV PEAK VELOCITY: 137.8 CM/S
ECHO AV REGURGITANT PHT: 500.5 CM
ECHO EST RA PRESSURE: 5 MMHG
ECHO LA AREA 4C: 20.5 CM2
ECHO LA MAJOR AXIS: 3.27 CM
ECHO LA VOL 2C: 64.71 ML (ref 22–52)
ECHO LA VOL 4C: 52.64 ML (ref 22–52)
ECHO LA VOL BP: 69.52 ML (ref 22–52)
ECHO LV E' LATERAL VELOCITY: 6.59 CM/S
ECHO LV E' SEPTAL VELOCITY: 4.01 CM/S
ECHO LV INTERNAL DIMENSION DIASTOLIC: 3.85 CM (ref 3.9–5.3)
ECHO LV INTERNAL DIMENSION SYSTOLIC: 2.64 CM
ECHO LV IVSD: 1.47 CM (ref 0.6–0.9)
ECHO LV MASS 2D: 249.6 G (ref 67–162)
ECHO LV POSTERIOR WALL DIASTOLIC: 1.45 CM (ref 0.6–0.9)
ECHO LVOT DIAM: 2.02 CM
ECHO LVOT PEAK GRADIENT: 4.1 MMHG
ECHO LVOT PEAK VELOCITY: 101.72 CM/S
ECHO MV A VELOCITY: 145.08 CM/S
ECHO MV AREA PHT: 4.5 CM2
ECHO MV E DECELERATION TIME (DT): 168.9 MS
ECHO MV E VELOCITY: 62.24 CM/S
ECHO MV E/A RATIO: 0.43
ECHO MV E/E' LATERAL: 9.44
ECHO MV E/E' RATIO (AVERAGED): 12.48
ECHO MV E/E' SEPTAL: 15.52
ECHO MV PRESSURE HALF TIME (PHT): 49 MS
ECHO PULMONARY ARTERY SYSTOLIC PRESSURE (PASP): 27.6 MMHG
ECHO PV MAX VELOCITY: 89.45 CM/S
ECHO PV PEAK GRADIENT: 3.2 MMHG
ECHO RIGHT VENTRICULAR SYSTOLIC PRESSURE (RVSP): 27.6 MMHG
ECHO TV REGURGITANT MAX VELOCITY: 237.8 CM/S
ECHO TV REGURGITANT PEAK GRADIENT: 22.6 MMHG
EOSINOPHIL # BLD: 0 K/UL (ref 0–0.4)
EOSINOPHIL NFR BLD: 0 % (ref 0–7)
ERYTHROCYTE [DISTWIDTH] IN BLOOD BY AUTOMATED COUNT: 16.6 % (ref 11.5–14.5)
GLUCOSE SERPL-MCNC: 114 MG/DL (ref 65–100)
HCT VFR BLD AUTO: 31.4 % (ref 35–47)
HGB BLD-MCNC: 10 G/DL (ref 11.5–16)
IMM GRANULOCYTES # BLD AUTO: 0 K/UL (ref 0–0.04)
IMM GRANULOCYTES NFR BLD AUTO: 0 % (ref 0–0.5)
LYMPHOCYTES # BLD: 0.4 K/UL (ref 0.8–3.5)
LYMPHOCYTES NFR BLD: 2 % (ref 12–49)
MCH RBC QN AUTO: 26.5 PG (ref 26–34)
MCHC RBC AUTO-ENTMCNC: 31.8 G/DL (ref 30–36.5)
MCV RBC AUTO: 83.3 FL (ref 80–99)
MONOCYTES # BLD: 1.4 K/UL (ref 0–1)
MONOCYTES NFR BLD: 8 % (ref 5–13)
NEUTS SEG # BLD: 15.8 K/UL (ref 1.8–8)
NEUTS SEG NFR BLD: 90 % (ref 32–75)
NRBC # BLD: 0 K/UL (ref 0–0.01)
NRBC BLD-RTO: 0 PER 100 WBC
PISA AR MAX VEL: 378.41 CM/S
PLATELET # BLD AUTO: 270 K/UL (ref 150–400)
PMV BLD AUTO: 10.6 FL (ref 8.9–12.9)
POTASSIUM SERPL-SCNC: 3.7 MMOL/L (ref 3.5–5.1)
RBC # BLD AUTO: 3.77 M/UL (ref 3.8–5.2)
RBC MORPH BLD: ABNORMAL
RBC MORPH BLD: ABNORMAL
SODIUM SERPL-SCNC: 142 MMOL/L (ref 136–145)
WBC # BLD AUTO: 17.6 K/UL (ref 3.6–11)

## 2019-04-04 PROCEDURE — 74011250636 HC RX REV CODE- 250/636: Performed by: FAMILY MEDICINE

## 2019-04-04 PROCEDURE — 80048 BASIC METABOLIC PNL TOTAL CA: CPT

## 2019-04-04 PROCEDURE — 51798 US URINE CAPACITY MEASURE: CPT

## 2019-04-04 PROCEDURE — 74011250637 HC RX REV CODE- 250/637: Performed by: FAMILY MEDICINE

## 2019-04-04 PROCEDURE — 36415 COLL VENOUS BLD VENIPUNCTURE: CPT

## 2019-04-04 PROCEDURE — 74011250637 HC RX REV CODE- 250/637: Performed by: INTERNAL MEDICINE

## 2019-04-04 PROCEDURE — 74011250637 HC RX REV CODE- 250/637: Performed by: NURSE PRACTITIONER

## 2019-04-04 PROCEDURE — 74011000258 HC RX REV CODE- 258: Performed by: FAMILY MEDICINE

## 2019-04-04 PROCEDURE — 93306 TTE W/DOPPLER COMPLETE: CPT

## 2019-04-04 PROCEDURE — 65270000032 HC RM SEMIPRIVATE

## 2019-04-04 PROCEDURE — 85025 COMPLETE CBC W/AUTO DIFF WBC: CPT

## 2019-04-04 RX ORDER — METRONIDAZOLE 500 MG/100ML
500 INJECTION, SOLUTION INTRAVENOUS EVERY 12 HOURS
Status: DISCONTINUED | OUTPATIENT
Start: 2019-04-04 | End: 2019-04-09 | Stop reason: HOSPADM

## 2019-04-04 RX ORDER — NALOXONE HYDROCHLORIDE 0.4 MG/ML
0.4 INJECTION, SOLUTION INTRAMUSCULAR; INTRAVENOUS; SUBCUTANEOUS
Status: CANCELLED | OUTPATIENT
Start: 2019-04-04 | End: 2019-04-04

## 2019-04-04 RX ORDER — SODIUM CHLORIDE 9 MG/ML
100 INJECTION, SOLUTION INTRAVENOUS CONTINUOUS
Status: CANCELLED | OUTPATIENT
Start: 2019-04-04 | End: 2019-04-04

## 2019-04-04 RX ORDER — SODIUM CHLORIDE 0.9 % (FLUSH) 0.9 %
5-40 SYRINGE (ML) INJECTION EVERY 8 HOURS
Status: CANCELLED | OUTPATIENT
Start: 2019-04-04

## 2019-04-04 RX ORDER — MIDAZOLAM HYDROCHLORIDE 1 MG/ML
.25-1 INJECTION, SOLUTION INTRAMUSCULAR; INTRAVENOUS
Status: CANCELLED | OUTPATIENT
Start: 2019-04-04 | End: 2019-04-04

## 2019-04-04 RX ORDER — DEXTROMETHORPHAN/PSEUDOEPHED 2.5-7.5/.8
1.2 DROPS ORAL
Status: CANCELLED | OUTPATIENT
Start: 2019-04-04

## 2019-04-04 RX ORDER — SODIUM CHLORIDE 0.9 % (FLUSH) 0.9 %
5-40 SYRINGE (ML) INJECTION AS NEEDED
Status: CANCELLED | OUTPATIENT
Start: 2019-04-04

## 2019-04-04 RX ORDER — AMLODIPINE BESYLATE 5 MG/1
5 TABLET ORAL DAILY
Status: DISCONTINUED | OUTPATIENT
Start: 2019-04-04 | End: 2019-04-05

## 2019-04-04 RX ORDER — CLONIDINE 0.2 MG/24H
1 PATCH, EXTENDED RELEASE TRANSDERMAL
Status: DISCONTINUED | OUTPATIENT
Start: 2019-04-04 | End: 2019-04-05

## 2019-04-04 RX ORDER — FLUMAZENIL 0.1 MG/ML
0.2 INJECTION INTRAVENOUS
Status: CANCELLED | OUTPATIENT
Start: 2019-04-04 | End: 2019-04-04

## 2019-04-04 RX ORDER — ATROPINE SULFATE 0.1 MG/ML
0.5 INJECTION INTRAVENOUS
Status: CANCELLED | OUTPATIENT
Start: 2019-04-04 | End: 2019-04-05

## 2019-04-04 RX ORDER — FACIAL-BODY WIPES
10 EACH TOPICAL ONCE
Status: COMPLETED | OUTPATIENT
Start: 2019-04-04 | End: 2019-04-04

## 2019-04-04 RX ORDER — FENTANYL CITRATE 50 UG/ML
100 INJECTION, SOLUTION INTRAMUSCULAR; INTRAVENOUS
Status: CANCELLED | OUTPATIENT
Start: 2019-04-04 | End: 2019-04-04

## 2019-04-04 RX ORDER — EPINEPHRINE 0.1 MG/ML
1 INJECTION INTRACARDIAC; INTRAVENOUS
Status: CANCELLED | OUTPATIENT
Start: 2019-04-04 | End: 2019-04-05

## 2019-04-04 RX ORDER — DIPHENHYDRAMINE HYDROCHLORIDE 50 MG/ML
50 INJECTION, SOLUTION INTRAMUSCULAR; INTRAVENOUS ONCE
Status: CANCELLED | OUTPATIENT
Start: 2019-04-04 | End: 2019-04-04

## 2019-04-04 RX ADMIN — TEMAZEPAM 15 MG: 15 CAPSULE ORAL at 23:16

## 2019-04-04 RX ADMIN — CEFTRIAXONE 1 G: 1 INJECTION, POWDER, FOR SOLUTION INTRAMUSCULAR; INTRAVENOUS at 19:57

## 2019-04-04 RX ADMIN — METRONIDAZOLE 500 MG: 500 INJECTION, SOLUTION INTRAVENOUS at 23:53

## 2019-04-04 RX ADMIN — BISACODYL 10 MG: 10 SUPPOSITORY RECTAL at 17:13

## 2019-04-04 RX ADMIN — AMLODIPINE BESYLATE 5 MG: 5 TABLET ORAL at 17:13

## 2019-04-04 RX ADMIN — Medication 10 ML: at 23:19

## 2019-04-04 RX ADMIN — LOSARTAN POTASSIUM 100 MG: 50 TABLET ORAL at 10:11

## 2019-04-04 RX ADMIN — SODIUM CHLORIDE 50 ML/HR: 900 INJECTION, SOLUTION INTRAVENOUS at 17:13

## 2019-04-04 RX ADMIN — Medication 10 ML: at 15:03

## 2019-04-04 RX ADMIN — METRONIDAZOLE 500 MG: 500 INJECTION, SOLUTION INTRAVENOUS at 10:14

## 2019-04-04 RX ADMIN — ATORVASTATIN CALCIUM 40 MG: 40 TABLET, FILM COATED ORAL at 23:16

## 2019-04-04 RX ADMIN — Medication 10 ML: at 05:45

## 2019-04-04 RX ADMIN — OXYBUTYNIN CHLORIDE 5 MG: 5 TABLET ORAL at 23:16

## 2019-04-04 NOTE — PROGRESS NOTES
Clinical Pharmacy Note: Metronidazole Dosing Please note that the metronidazole dose for Yvonne Thao has been changed to 500 mg IV q12h per [facility]-approved protocol. Please contact the pharmacy with any questions.  
 
SOL MenendezD

## 2019-04-04 NOTE — PROGRESS NOTES
1908- Pt brought to floor from ED. RN giving report stated MD was aware of pt /61 and that pt would be admitted to the floor.

## 2019-04-04 NOTE — PROGRESS NOTES
Took report from Ankit Muniz, [facility] in the ED. Questioned pt /66 and whether the pt should come to the floor. [address] stated \"The doctor is aware of the blood pressure and that this patient will be coming to your floor. \"

## 2019-04-04 NOTE — PROGRESS NOTES
NUTRITION COMPLETE ASSESSMENT 
RECOMMENDATIONS:  
1. Obtain measured weight - no weight obtained this admit and pt reporting wt loss 2. Diet advancement per GI after flex sig tomorrow 
 - add Ensure Compact TID once on full liquids Interventions/Plan:  
Food/Nutrient Delivery:    (add supplements once diet advanced) Assessment:  
Reason for Assessment: [x]BPA/MST Referral  
 
Diet: NPO Supplements: none Nutritionally Significant Medications: [x] Reviewed & Includes:  Ceftriaxone, lexapro, synthroid, flagyl, protonix, NS @ 50ml/hr Subjective: Pt sleeping soundly at time of visit. Objective: 
Pt admitted for abd pain from [facility]. PMHx: chronic abd pain, GERD, hiatal hernia (s/p Nissen 2012), HTN, Abd pain, bloating and nausea prior to admit. RN reporting ascites during rounds. Seen by GI with plans for flex sigmoidoscopy tomorrow. Poor appetite d/t above symptoms noted. Pt reporting severe wt loss of 8# (~7%) x 2 months. No weight this admit. Please weigh on standing scale, or obtain zero-ed bedscale. Wt Readings from Last 2 Encounters:  
10/08/18 53.8 kg (118 lb 8 oz) 07/19/17 56.7 kg (125 lb) Patient meets criteria for Severe Moderate Protein Calorie Malnutrition as evidenced by:  
ASPEN Malnutrition Criteria Acute Illness, Chronic Illness, or Social/Enviornmental: Acute illness Energy Intake: <75% est energy req for > 7 days Weight Loss: Greater than 7.5% x 3 mos Fluid Accumulation: Mild ASPEN Malnutrition Score - Acute Illness: 8. Will follow for results of GI work-up, diet advancement/tolerance, PO intake, wt, and pt interview as able. Estimated Nutrition Needs:  
Kcals/day: 5898 Kcals/day(1125-1260kcal) Protein: 50 g(1.1g/kg) Fluid: 1260 ml(1ml/kcal) Based On: Kcal/kg - specify (Comment)(25-28kcal/kg) Weight Used: Actual wt(45kg) Pt expected to meet estimated nutrient needs:  []   Yes     []  No [x] Unable to predict at this time Nutrition Diagnosis:  
1. Unintended weight loss(Malnutrition) related to poor appetite 2/2 altered GI fx as evidenced by severe wt loss of 7% x 2 months; poor PO PTA, abd pain, bloating;  
 
Goals:   
 Diet advancement and tolerance of at least 50% meals in 4-5 days; wt maintenance Monitoring & Evaluation: - Total energy intake, Protein intake - Weight/weight change, Electrolyte and renal profile, GI 
 
Previous Nutrition Goals Met:   N/A Previous Recommendations:    N/A Education & Discharge Needs: 
 [x] None Identified 
 [] Identified and addressed [x] Participated in care plan, discharge planning, and/or interdisciplinary rounds Cultural, Yazdanism and ethnic food preferences identified: None Skin Integrity: [x]Intact  []Other Edema: []None [x]Other: ascites Last BM: 4/2 Food Allergies: []None []Other Diet Restrictions: Cultural/Anabaptism Preference(s): None Anthropometrics:   
Weight Loss Metrics 4/3/2019 10/8/2018 7/19/2017 6/23/2016 9/4/2015 2/24/2015 8/27/2014 Today's Wt - 118 lb 8 oz 125 lb 147 lb 174 lb 182 lb 184 lb BMI 23.14 kg/m2 23.14 kg/m2 24.41 kg/m2 28.71 kg/m2 33.98 kg/m2 35.54 kg/m2 35.94 kg/m2 Height: 5' (152.4 cm), Body mass index is 23.14 kg/m². IBW : 45.4 kg (100 lb),   
 ,   
 
Labs:   
Lab Results Component Value Date/Time Sodium 142 04/04/2019 04:50 AM  
 Potassium 3.7 04/04/2019 04:50 AM  
 Chloride 109 (H) 04/04/2019 04:50 AM  
 CO2 24 04/04/2019 04:50 AM  
 Glucose 114 (H) 04/04/2019 04:50 AM  
 BUN 30 (H) 04/04/2019 04:50 AM  
 Creatinine 0.83 04/04/2019 04:50 AM  
 Calcium 8.5 04/04/2019 04:50 AM  
 Magnesium 1.9 04/03/2019 10:22 AM  
 Albumin 3.3 (L) 04/03/2019 10:22 AM  
 
Dedra Albrecht RD Pager #[phone]

## 2019-04-04 NOTE — PROGRESS NOTES
The patient is due for a colonoscopy, blood pressure continues to be elevated, PO medications were given, bed alarm set, & will continue to monitor blood pressure. Family to be updated.

## 2019-04-04 NOTE — PROGRESS NOTES
Bedside shift change report given to Lucinda Ma RN (oncoming nurse) by Katey Luis RN (offgoing nurse). Report included the following information SBAR, Kardex, Intake/Output and MAR.

## 2019-04-04 NOTE — ANESTHESIA PREPROCEDURE EVALUATION
Relevant Problems No relevant active problems Anesthetic History No history of anesthetic complications Review of Systems / Medical History Patient summary reviewed, nursing notes reviewed and pertinent labs reviewed Pulmonary Within defined limits Neuro/Psych Within defined limits Cardiovascular Hypertension Hyperlipidemia Comments: Hx DVT  
GI/Hepatic/Renal 
  
GERD Endo/Other Hypothyroidism Arthritis and anemia Other Findings Physical Exam 
 
Airway Mallampati: I 
TM Distance: > 6 cm Neck ROM: normal range of motion Mouth opening: Normal 
 
 Cardiovascular Rhythm: regular Rate: normal 
 
 
 
 Dental 
No notable dental hx Pulmonary Breath sounds clear to auscultation Abdominal 
 
 
 
 Other Findings Anesthetic Plan ASA: 3 Anesthesia type: MAC Anesthetic plan and risks discussed with: Patient

## 2019-04-04 NOTE — PROGRESS NOTES
Nurse received patient from day shift nurse Javad Cutler with elevated BP of 218/68. Day shift nurse Javad Cutler reported that emergency room nurse stated MD is aware of patient elevated BP and wants to continue with transfer to unit. Clonidine patch was placed on patient while she was in the ED. Upon assessment of patient, BP retaken and now 199/64. Patient remains with clonidine patch in place. Patient alert and oriented and appears asymptomatic and resting quietly in bed. Will continue to monitor for any further changes.

## 2019-04-04 NOTE — PROGRESS NOTES
Bedside shift change report given to Magaly Paredes RN (oncoming nurse) by Alie Covarrubias RN (offgoing nurse). Report included the following information SBAR, Kardex, Intake/Output and Recent Results. 0035: BP now 210/73, pt resting quietly in bed. Per RN report, MD aware of elevated BP. Clonidine patch in place. No PRN medications for pt.

## 2019-04-04 NOTE — PROGRESS NOTES
Medical Progress Note NAME: Vimal Wesley :  1924 MRM:  [id number] Date/Time: 2019  10:18 AM 
 
Problem List:  
Active Problems: 
  Abdominal pain, epigastric (4/3/2019) Abdominal pain (4/3/2019) Subjective:  
 
Patient sleeping ; no complaints. Past Medical History:  
Diagnosis Date  Arthritis HANDS  Chronic pain UPPER ABDOMEN  
 Elevated lipids 2011  Gastrointestinal disease Acid reflux  Gastrointestinal disorder Hiatial hernia  GERD (gastroesophageal reflux disease) 2011  
 HTN (hypertension) 2011  Hypothyroidism 2013  Macular degeneration  Pneumonia  Post herpetic neuralgia 2011  PVD (peripheral vascular disease) ([facility] [address] 2000  Thromboembolus ([facility] [address]  LT. LEG  Thyroid disease  Unspecified adverse effect of anesthesia DIFFICULTY AWAKENING, WAS COMBATIVE  
 
 
ROS:  General: negative for fever, chills, sweats, weakness Respiratory:  negative for cough, sputum production, SOB, wheezing, FELIPE, pleuritic pain 
Cardiology:  negative for chest pain, palpitations, orthopnea, PND, edema, syncope Gastrointestinal: positive for abd pain on palpation. Not otherwise. Objective:  
 
 
Vitals:  
 
 
  
Last 24hrs VS reviewed since prior progress note. Most recent are: 
 
Visit Vitals BP (!) 224/83 (BP 1 Location: Right arm, BP Patient Position: At rest) Pulse 73 Temp 98.1 °F (36.7 °C) Resp 18 Ht 5' (1.524 m) SpO2 98% BMI 23.14 kg/m² SpO2 Readings from Last 6 Encounters:  
19 98% 16 93% 13 97% 12 95% 12 99% 10/15/12 93% O2 Flow Rate (L/min): 3 l/min Intake/Output Summary (Last 24 hours) at 2019 1018 Last data filed at 2019 7847 Gross per 24 hour Intake 300 ml Output 100 ml Net 200 ml Exam:  
 
General   Wd 81 yo wf 
Respiratory   clear Cardiology  regular Abdominal  Soft, decreased bowel sounds, ruq tenderness Extremities  No edema Lab Data Reviewed: (see below) Medications Reviewed: (see below) 
 
______________________________________________________________________ Medications:  
 
Current Facility-Administered Medications Medication Dose Route Frequency  metroNIDAZOLE (FLAGYL) IVPB premix 500 mg  500 mg IntraVENous Q12H  
 atorvastatin (LIPITOR) tablet 40 mg  40 mg Oral QHS  aspirin chewable tablet 81 mg  81 mg Oral DAILY  cloNIDine (CATAPRES) 0.1 mg/24 hr patch 1 Patch  1 Patch TransDERmal Q7D  
 escitalopram oxalate (LEXAPRO) tablet 10 mg  10 mg Oral DAILY  levothyroxine (SYNTHROID) tablet 50 mcg  50 mcg Oral ACB  losartan (COZAAR) tablet 100 mg  100 mg Oral DAILY  pantoprazole (PROTONIX) tablet 40 mg  40 mg Oral ACB  oxybutynin (DITROPAN) tablet 5 mg  5 mg Oral QHS  temazepam (RESTORIL) capsule 15 mg  15 mg Oral QHS  
 0.9% sodium chloride infusion  50 mL/hr IntraVENous CONTINUOUS  
 sodium chloride (NS) flush 5-40 mL  5-40 mL IntraVENous Q8H  
 sodium chloride (NS) flush 5-40 mL  5-40 mL IntraVENous PRN  
 cefTRIAXone (ROCEPHIN) 1 g in 0.9% sodium chloride (MBP/ADV) 50 mL  1 g IntraVENous Q24H Lab Review:  
 
Recent Labs 04/04/19 
0450 04/03/19 
1022 WBC 17.6* 9.9 HGB 10.0* 9.3* HCT 31.4* 30.6*  247 Recent Labs 04/04/19 
0450 04/03/19 
1022  142  
K 3.7 3.3*  
* 107 CO2 24 26 * 114* BUN 30* 27* CREA 0.83 0.89 CA 8.5 8.8 MG  --  1.9 ALB  --  3.3* TBILI  --  0.4 SGOT  --  22 ALT  --  15 No results found for: Pamela Toscano No results for input(s): PH, PCO2, PO2, HCO3, FIO2 in the last 72 hours. No results for input(s): INR in the last 72 hours. No lab exists for component: INREXT Other pertinent lab: NA Assessment:  
 
Patient Active Problem List  
Diagnosis Code  GERD (gastroesophageal reflux disease) K21.9  Elevated lipids E78.5  Post herpetic neuralgia B02.29  
 Encounter for long-term (current) drug use Z79.899  
 Hiatal hernia with gastroesophageal reflux K21.9, K44.9  Syncope R55  Protein-calorie malnutrition, mild (HCC) E44.1  Hypothyroidism due to acquired atrophy of thyroid E03.4  Essential hypertension I10  Abdominal pain, epigastric R10.13  Abdominal pain R10.9 Plan: 1. Elevated white count- abd pain- being covered for infectious process without defiinite diagnosis 2. Add back cozaar for bp    
 
        
___________________________________________________ Attending Physician: Becca Dobbs MD

## 2019-04-04 NOTE — PROGRESS NOTES
Bedside shift change report given to DARNELL Tirado (oncoming nurse) by Damir Bustillos RN (offgoing nurse). Report included the following information SBAR, Kardex, Intake/Output, MAR and Recent Results.

## 2019-04-04 NOTE — CONSULTS
[address] [facility].   [address] [facility] [address]        GASTROENTEROLOGY CONSULTATION NOTE  Will Emi Solano  [phone] office  [phone] NP/PA in-hospital cell phone M-F until 4:30PM  After 5PM or on weekends, please call  for physician on call        NAME:  Vimal Wesley   :   1924   MRN:   [id number]       Referring Physician: Dr. Jackie Gonzalez Date: 2019 10:09 AM    Chief Complaint: abdominal pain     History of Present Illness:  Patient is a 80 y. o. who is seen in consultation at the request of Dr. Shireen Lange for abdominal pain. Patient has a past medical history significant for hypertension, peripheral vascular disease, and hypothyroidism. She presented to the ED from [facility] with complaints of abdominal pain. Patient was admitted to the hospital on 4/3/19. Patient complains of intermittent lower abdominal pain for the last several weeks. She is unable to describe the pain. No clear aggravating or alleviating factors. There is associated nausea. No reflux or vomiting. No change in bowel habits, change in stool caliber, diarrhea, or constipation. No melena or hematochezia. She has a bowel movement every 1-2 days that is formed, brown, and easy to pass. No fevers. No NSAID use. No anticoagulation. She is on aspirin 81 mg daily. + Alcohol use (a few times per week). No tobacco use. History of appendectomy, cholecystectomy, and hiatal hernia repair. Colonoscopy (05) by Dr. Jonnie Santa was normal.  EGD (10/15/12) by Dr. Jonnie Santa showed gastric polyps and a hiatal hernia. I have reviewed the emergency room note, hospital admission note, notes by all other clinicians who have seen the patient during this hospitalization to date. I have reviewed the problem list and the reason for this hospitalization.  I have reviewed the allergies and the medications the patient was taking at home prior to this hospitalization. PMH:  Past Medical History:   Diagnosis Date    Arthritis     HANDS    Chronic pain     UPPER ABDOMEN    Elevated lipids 5/31/2011    Gastrointestinal disease     Acid reflux    Gastrointestinal disorder     Hiatial hernia    GERD (gastroesophageal reflux disease) 5/31/2011    HTN (hypertension) 5/31/2011    Hypothyroidism 2/11/2013    Macular degeneration     Pneumonia     Post herpetic neuralgia 5/31/2011    PVD (peripheral vascular disease) ([facility] [address] 03/2000    Thromboembolus ([facility] [address] 2001    LT. LEG    Thyroid disease     Unspecified adverse effect of anesthesia     DIFFICULTY AWAKENING, WAS COMBATIVE       PSH:  Past Surgical History:   Procedure Laterality Date    HX APPENDECTOMY      HX CARPAL TUNNEL RELEASE  3/2000    HX CATARACT REMOVAL      WILFREDO. W/ LENS IMPLANT    HX CHOLECYSTECTOMY      HX GI      COLONOSCOPY AND ENDOSCOPY    HX GI  12/4/12    DaVinci Hiatal Hernia Repair with Mesh and Toupet Fundoplication    HX MOHS PROCEDURES      LT.    HX ORTHOPAEDIC      WILFREDO. CARPAL TUNNEL    HX TONSILLECTOMY         Allergies: Allergies   Allergen Reactions    Bactrim [Sulfamethoprim Ds] Other (comments)    Darvocet A500 [Propoxyphene N-Acetaminophen] Nausea and Vomiting    Other Medication Rash     p-phenylenediamine    Pcn [Penicillins] Unknown (comments)       Home Medications:  Prior to Admission Medications   Prescriptions Last Dose Informant Patient Reported? Taking? FOLIC ACID PO Unknown at Unknown time  Yes No   Sig: Take 400 mg by mouth. amLODIPine (NORVASC) 2.5 mg tablet Unknown at Unknown time  No No   Sig: Take 1 Tab by mouth daily. aspirin 81 mg chewable tablet  Self Yes Yes   Sig: Take 81 mg by mouth daily. atorvastatin (LIPITOR) 40 mg tablet   No Yes   Sig: TAKE ONE (1) TABLET BY MOUTH AT BEDTIME.   cloNIDine (CATAPRES) 0.1 mg/24 hr ptwk   No Yes   Sig: APPLY 1 PATCH TOPICALLY.  CHANGE WEEKLY   escitalopram oxalate (LEXAPRO) 10 mg tablet   No Yes Sig: TAKE ONE (1) TABLET BY MOUTH EVERY DAY. .   hydroCHLOROthiazide (MICROZIDE) 12.5 mg capsule Unknown at Unknown time  No No   Sig: TAKE ONE (1) CAPSULE BY MOUTH EVERY DAY. levothyroxine (SYNTHROID) 50 mcg tablet   No Yes   Sig: Take 1 Tab by mouth Daily (before breakfast). losartan (COZAAR) 100 mg tablet   Yes Yes   Sig: Take 100 mg by mouth daily. omeprazole (PRILOSEC) 40 mg capsule   Yes Yes   Sig: Take 40 mg by mouth daily. oxybutynin (DITROPAN) 5 mg tablet   No Yes   Sig: TAKE ONE (1) TABLET BY MOUTH AT BEDTIME. temazepam (RESTORIL) 15 mg capsule   No Yes   Sig: TAKE ONE (1) CAPSULE BY MOUTH AT BEDTIME AS NEEDED FOR INSOMNIA.   vit A/vit C/vit E/zinc/copper (PRESERVISION AREDS PO) Unknown at Unknown time  Yes No   Sig: Take  by mouth daily.       Facility-Administered Medications: None       Hospital Medications:  Current Facility-Administered Medications   Medication Dose Route Frequency    metroNIDAZOLE (FLAGYL) IVPB premix 500 mg  500 mg IntraVENous Q12H    atorvastatin (LIPITOR) tablet 40 mg  40 mg Oral QHS    aspirin chewable tablet 81 mg  81 mg Oral DAILY    cloNIDine (CATAPRES) 0.1 mg/24 hr patch 1 Patch  1 Patch TransDERmal Q7D    escitalopram oxalate (LEXAPRO) tablet 10 mg  10 mg Oral DAILY    levothyroxine (SYNTHROID) tablet 50 mcg  50 mcg Oral ACB    losartan (COZAAR) tablet 100 mg  100 mg Oral DAILY    pantoprazole (PROTONIX) tablet 40 mg  40 mg Oral ACB    oxybutynin (DITROPAN) tablet 5 mg  5 mg Oral QHS    temazepam (RESTORIL) capsule 15 mg  15 mg Oral QHS    0.9% sodium chloride infusion  50 mL/hr IntraVENous CONTINUOUS    sodium chloride (NS) flush 5-40 mL  5-40 mL IntraVENous Q8H    sodium chloride (NS) flush 5-40 mL  5-40 mL IntraVENous PRN    cefTRIAXone (ROCEPHIN) 1 g in 0.9% sodium chloride (MBP/ADV) 50 mL  1 g IntraVENous Q24H       Social History:  Social History     Tobacco Use    Smoking status: Former Smoker     Packs/day: 0.50     Years: 7.00     Pack years: 3.50     Last attempt to quit: 1952     Years since quittin.3    Smokeless tobacco: Never Used   Substance Use Topics    Alcohol use: Yes     Alcohol/week: 1.0 oz     Types: 1 Glasses of wine, 1 Shots of liquor per week       Family History:  Family History   Problem Relation Age of Onset    Heart Disease Brother          age 57's    Cancer Brother         LUNG    Heart Disease Brother          age 66's       Review of Systems:  Constitutional: negative fever, negative chills, negative weight loss  Eyes:   negative visual changes  ENT:   negative sore throat, tongue or lip swelling  Respiratory:  negative cough, negative dyspnea  Cards:  negative for chest pain, palpitations, lower extremity edema  GI:   See HPI  :  negative for frequency, dysuria  Integument:  negative for rash and pruritus  Heme:  negative for easy bruising and gum/nose bleeding  Musculoskeletal:negative for myalgias, back pain and muscle weakness  Neuro:    negative for headaches, dizziness, vertigo  Psych: negative for feelings of anxiety, depression     Objective:     Patient Vitals for the past 8 hrs:   BP Temp Pulse Resp SpO2 Height   19 0953 (!) 224/83 98.1 °F (36.7 °C) 73 18 98 %    19 0800 199/65 98.7 °F (37.1 °C) 78 16 91 %    19 0738      5' (1.524 m)   19 0452 183/65 98.3 °F (36.8 °C) 65 16 92 %      No intake/output data recorded.  1901 -  0700  In: 300 [I.V.:300]  Out: 100 [Urine:100]    EXAM:     CONST:  Pleasant female lying in bed, no acute distress   NEURO:  Alert and oriented x 3   HEENT: EOMI, no scleral icterus   LUNGS: CTA bilaterally anteriorly   CARD:  S1 S2   ABD:  Soft, mildly distended, generalized tenderness, no rebound, no guarding. + Bowel sounds.    EXT:  Warm   PSYCH: Full, not anxious     Data Review     Recent Labs     19  0450 19  1022   WBC 17.6* 9.9   HGB 10.0* 9.3*   HCT 31.4* 30.6*    247     Recent Labs 04/04/19  0450 04/03/19  1022    142   K 3.7 3.3*   * 107   CO2 24 26   BUN 30* 27*   CREA 0.83 0.89   * 114*   CA 8.5 8.8     Recent Labs     04/03/19  1022   SGOT 22   *   TP 6.4   ALB 3.3*   GLOB 3.1   LPSE 112     No results for input(s): INR, PTP, APTT in the last 72 hours. No lab exists for component: INREXT      EXAM: CT ABD PELV W CONT (4/3/19)     INDICATION: bilat lower abdominal pain, eval for diverticulitis, colitis     COMPARISON: 6/23/2016      CONTRAST: 100 mL of Isovue-370.     TECHNIQUE:   Following the uneventful intravenous administration of contrast, thin axial  images were obtained through the abdomen and pelvis. Coronal and sagittal  reconstructions were generated. Oral contrast was not administered. CT dose  reduction was achieved through use of a standardized protocol tailored for this  examination and automatic exposure control for dose modulation.     FINDINGS:   LUNG BASES: Bilateral pleural effusions, moderate on the left and small on the  right with bibasilar atelectasis. The distal esophagus is fluid-filled, which  may correlate with reflux. Patchy atelectasis or scar in both lung bases. INCIDENTALLY IMAGED HEART AND MEDIASTINUM: Unremarkable. LIVER: No mass or biliary dilatation. The liver is hypodense, suggesting  steatosis. GALLBLADDER: Surgically absent  SPLEEN: No mass. PANCREAS: No mass or ductal dilatation. ADRENALS: Unremarkable. KIDNEYS: No mass, calculus, or hydronephrosis. STOMACH: Unremarkable. SMALL BOWEL: Small bowel shows diffuse mild dilatation ranging from 3.0 to 4.4  cm without definite wall thickening. COLON: No dilatation or wall thickening. A segmental narrowing appears in the  sigmoid colon, persistent on 2 separate sequences. APPENDIX: Surgically absent  PERITONEUM: Ascites in a moderate amount throughout the abdomen and pelvis. RETROPERITONEUM: No lymphadenopathy or aortic aneurysm.   REPRODUCTIVE ORGANS: Uterine leiomyomata. URINARY BLADDER: No mass or calculus. BONES: No destructive bone lesion. ADDITIONAL COMMENTS: N/A     IMPRESSION  IMPRESSION: Without definite wall thickening. 1. Bilateral pleural effusions, with bibasilar atelectasis, new since the prior  study in 2016. 2. Moderate ascites, also new. 3. Diffuse small bowel dilatation without a discrete transition. 4. No direct evidence for acute diverticulitis or colitis. However, there is a  focal segmental narrowing in the sigmoid colon which should be evaluated and  correlated with colonoscopy. 5. Other incidental and postoperative changes. Assessment:   · Abdominal pain: WBC 17.6, Hgb 10.0, platelets 921, LFTs unremarkable, lipase normal, lactic acid normal. CT abdomen/pelvis with IV contrast (4/3/19): moderate ascites; diffuse small bowel dilatation without a discrete transition; no direct evidence for acute diverticulitis or colitis; however, there is a focal segmental narrowing in the sigmoid colon which should be evaluated and correlated with colonoscopy. Patient Active Problem List   Diagnosis Code    GERD (gastroesophageal reflux disease) K21.9    Elevated lipids E78.5    Post herpetic neuralgia B02.29    Encounter for long-term (current) drug use Z79.899    Hiatal hernia with gastroesophageal reflux K21.9, K44.9    Syncope R55    Protein-calorie malnutrition, mild (HCC) E44.1    Hypothyroidism due to acquired atrophy of thyroid E03.4    Essential hypertension I10    Abdominal pain, epigastric R10.13    Abdominal pain R10.9     Plan:     · NPO after midnight  · Continue antibiotics  · Continue supportive measures  · Plan for flexible sigmoidoscopy tomorrow with Dr. Lisbet Price. Risks were discussed with the patient. Patient is in agreement to proceed. Please verify consent has been obtained.    · Patient was discussed with and will be seen by Dr. Lisbet Price  · Thank you for allowing me to participate in care of [place] [address] Signed By: OSORIO Lester     4/4/2019  10:09 AM       GI Attending: Patient seen and examined. Limited history was obtained from her as she was feeling very tired per her report. We will discuss plan with her daughter who is her medical power of . If within her goals of care, we can perform flexible sigmoidoscopy tomorrow. Chito Valenzuela MD     Addendum 16:30: reviewed risks with daughter Aydin and she is agreeable to have flex sig done tomorrow - nurse to get consent signed - Richie Gonzalez

## 2019-04-05 ENCOUNTER — APPOINTMENT (OUTPATIENT)
Dept: GENERAL RADIOLOGY | Age: 84
DRG: 391 | End: 2019-04-05
Attending: INTERNAL MEDICINE
Payer: MEDICARE

## 2019-04-05 ENCOUNTER — APPOINTMENT (OUTPATIENT)
Dept: CT IMAGING | Age: 84
DRG: 391 | End: 2019-04-05
Attending: INTERNAL MEDICINE
Payer: MEDICARE

## 2019-04-05 ENCOUNTER — ANESTHESIA (OUTPATIENT)
Dept: ENDOSCOPY | Age: 84
DRG: 391 | End: 2019-04-05
Payer: MEDICARE

## 2019-04-05 PROBLEM — K52.9 COLITIS: Status: ACTIVE | Noted: 2019-04-05

## 2019-04-05 LAB
ATRIAL RATE: 67 BPM
BASOPHILS # BLD: 0 K/UL (ref 0–0.1)
BASOPHILS NFR BLD: 0 % (ref 0–1)
CALCULATED P AXIS, ECG09: 16 DEGREES
CALCULATED R AXIS, ECG10: -56 DEGREES
CALCULATED T AXIS, ECG11: -36 DEGREES
DIAGNOSIS, 93000: NORMAL
DIFFERENTIAL METHOD BLD: ABNORMAL
EOSINOPHIL # BLD: 0 K/UL (ref 0–0.4)
EOSINOPHIL NFR BLD: 0 % (ref 0–7)
ERYTHROCYTE [DISTWIDTH] IN BLOOD BY AUTOMATED COUNT: 16.7 % (ref 11.5–14.5)
GLUCOSE BLD STRIP.AUTO-MCNC: 103 MG/DL (ref 65–100)
HCT VFR BLD AUTO: 31.2 % (ref 35–47)
HGB BLD-MCNC: 9.9 G/DL (ref 11.5–16)
IMM GRANULOCYTES # BLD AUTO: 0 K/UL (ref 0–0.04)
IMM GRANULOCYTES NFR BLD AUTO: 0 % (ref 0–0.5)
LYMPHOCYTES # BLD: 0.5 K/UL (ref 0.8–3.5)
LYMPHOCYTES NFR BLD: 5 % (ref 12–49)
MCH RBC QN AUTO: 26.3 PG (ref 26–34)
MCHC RBC AUTO-ENTMCNC: 31.7 G/DL (ref 30–36.5)
MCV RBC AUTO: 82.8 FL (ref 80–99)
MONOCYTES # BLD: 0.9 K/UL (ref 0–1)
MONOCYTES NFR BLD: 10 % (ref 5–13)
NEUTS SEG # BLD: 7.8 K/UL (ref 1.8–8)
NEUTS SEG NFR BLD: 85 % (ref 32–75)
NRBC # BLD: 0 K/UL (ref 0–0.01)
NRBC BLD-RTO: 0 PER 100 WBC
P-R INTERVAL, ECG05: 164 MS
PLATELET # BLD AUTO: 272 K/UL (ref 150–400)
PMV BLD AUTO: 10.6 FL (ref 8.9–12.9)
Q-T INTERVAL, ECG07: 430 MS
QRS DURATION, ECG06: 110 MS
QTC CALCULATION (BEZET), ECG08: 454 MS
RBC # BLD AUTO: 3.77 M/UL (ref 3.8–5.2)
RBC MORPH BLD: ABNORMAL
SERVICE CMNT-IMP: ABNORMAL
VENTRICULAR RATE, ECG03: 67 BPM
WBC # BLD AUTO: 9.2 K/UL (ref 3.6–11)

## 2019-04-05 PROCEDURE — 74011250637 HC RX REV CODE- 250/637: Performed by: FAMILY MEDICINE

## 2019-04-05 PROCEDURE — 82962 GLUCOSE BLOOD TEST: CPT

## 2019-04-05 PROCEDURE — 0042T CT CODE NEURO PERF W CBF: CPT

## 2019-04-05 PROCEDURE — 65270000032 HC RM SEMIPRIVATE

## 2019-04-05 PROCEDURE — 93005 ELECTROCARDIOGRAM TRACING: CPT

## 2019-04-05 PROCEDURE — 71045 X-RAY EXAM CHEST 1 VIEW: CPT

## 2019-04-05 PROCEDURE — 70450 CT HEAD/BRAIN W/O DYE: CPT

## 2019-04-05 PROCEDURE — 36415 COLL VENOUS BLD VENIPUNCTURE: CPT

## 2019-04-05 PROCEDURE — 74011000250 HC RX REV CODE- 250: Performed by: INTERNAL MEDICINE

## 2019-04-05 PROCEDURE — 74011000258 HC RX REV CODE- 258: Performed by: RADIOLOGY

## 2019-04-05 PROCEDURE — 70496 CT ANGIOGRAPHY HEAD: CPT

## 2019-04-05 PROCEDURE — 74011250636 HC RX REV CODE- 250/636: Performed by: INTERNAL MEDICINE

## 2019-04-05 PROCEDURE — 74011250636 HC RX REV CODE- 250/636: Performed by: FAMILY MEDICINE

## 2019-04-05 PROCEDURE — 74011250637 HC RX REV CODE- 250/637: Performed by: INTERNAL MEDICINE

## 2019-04-05 PROCEDURE — 74011000258 HC RX REV CODE- 258: Performed by: FAMILY MEDICINE

## 2019-04-05 PROCEDURE — 85025 COMPLETE CBC W/AUTO DIFF WBC: CPT

## 2019-04-05 PROCEDURE — 74011636320 HC RX REV CODE- 636/320: Performed by: RADIOLOGY

## 2019-04-05 RX ORDER — LORAZEPAM 2 MG/ML
0.5 INJECTION INTRAMUSCULAR
Status: DISCONTINUED | OUTPATIENT
Start: 2019-04-05 | End: 2019-04-09 | Stop reason: HOSPADM

## 2019-04-05 RX ORDER — METOPROLOL TARTRATE 5 MG/5ML
5 INJECTION INTRAVENOUS
Status: COMPLETED | OUTPATIENT
Start: 2019-04-05 | End: 2019-04-05

## 2019-04-05 RX ORDER — ENOXAPARIN SODIUM 100 MG/ML
30 INJECTION SUBCUTANEOUS EVERY 24 HOURS
Status: DISCONTINUED | OUTPATIENT
Start: 2019-04-05 | End: 2019-04-09 | Stop reason: HOSPADM

## 2019-04-05 RX ORDER — HYDRALAZINE HYDROCHLORIDE 20 MG/ML
10 INJECTION INTRAMUSCULAR; INTRAVENOUS
Status: DISCONTINUED | OUTPATIENT
Start: 2019-04-05 | End: 2019-04-09 | Stop reason: HOSPADM

## 2019-04-05 RX ORDER — AMLODIPINE BESYLATE 5 MG/1
10 TABLET ORAL DAILY
Status: DISCONTINUED | OUTPATIENT
Start: 2019-04-05 | End: 2019-04-09 | Stop reason: HOSPADM

## 2019-04-05 RX ORDER — CLONIDINE HYDROCHLORIDE 0.1 MG/1
0.1 TABLET ORAL
Status: COMPLETED | OUTPATIENT
Start: 2019-04-05 | End: 2019-04-05

## 2019-04-05 RX ORDER — AMLODIPINE BESYLATE 5 MG/1
5 TABLET ORAL
Status: COMPLETED | OUTPATIENT
Start: 2019-04-05 | End: 2019-04-05

## 2019-04-05 RX ORDER — CLONIDINE 0.3 MG/24H
1 PATCH, EXTENDED RELEASE TRANSDERMAL
Status: DISCONTINUED | OUTPATIENT
Start: 2019-04-05 | End: 2019-04-09 | Stop reason: HOSPADM

## 2019-04-05 RX ORDER — SODIUM CHLORIDE 0.9 % (FLUSH) 0.9 %
10 SYRINGE (ML) INJECTION
Status: COMPLETED | OUTPATIENT
Start: 2019-04-05 | End: 2019-04-05

## 2019-04-05 RX ADMIN — CEFTRIAXONE 1 G: 1 INJECTION, POWDER, FOR SOLUTION INTRAMUSCULAR; INTRAVENOUS at 19:49

## 2019-04-05 RX ADMIN — Medication 10 ML: at 14:00

## 2019-04-05 RX ADMIN — OXYBUTYNIN CHLORIDE 5 MG: 5 TABLET ORAL at 21:00

## 2019-04-05 RX ADMIN — LOSARTAN POTASSIUM 100 MG: 50 TABLET ORAL at 12:54

## 2019-04-05 RX ADMIN — ATORVASTATIN CALCIUM 40 MG: 40 TABLET, FILM COATED ORAL at 21:00

## 2019-04-05 RX ADMIN — ESCITALOPRAM OXALATE 10 MG: 10 TABLET ORAL at 12:52

## 2019-04-05 RX ADMIN — HYDRALAZINE HYDROCHLORIDE 10 MG: 20 INJECTION INTRAMUSCULAR; INTRAVENOUS at 20:59

## 2019-04-05 RX ADMIN — METRONIDAZOLE 500 MG: 500 INJECTION, SOLUTION INTRAVENOUS at 19:50

## 2019-04-05 RX ADMIN — AMLODIPINE BESYLATE 10 MG: 5 TABLET ORAL at 12:52

## 2019-04-05 RX ADMIN — IOPAMIDOL 120 ML: 755 INJECTION, SOLUTION INTRAVENOUS at 10:57

## 2019-04-05 RX ADMIN — METOPROLOL TARTRATE 5 MG: 5 INJECTION, SOLUTION INTRAVENOUS at 04:06

## 2019-04-05 RX ADMIN — CLONIDINE HYDROCHLORIDE 0.1 MG: 0.1 TABLET ORAL at 02:39

## 2019-04-05 RX ADMIN — SODIUM CHLORIDE 100 ML: 900 INJECTION, SOLUTION INTRAVENOUS at 10:57

## 2019-04-05 RX ADMIN — Medication 10 ML: at 06:53

## 2019-04-05 RX ADMIN — AMLODIPINE BESYLATE 5 MG: 5 TABLET ORAL at 01:09

## 2019-04-05 RX ADMIN — HYDRALAZINE HYDROCHLORIDE 10 MG: 20 INJECTION INTRAMUSCULAR; INTRAVENOUS at 09:59

## 2019-04-05 RX ADMIN — TEMAZEPAM 15 MG: 15 CAPSULE ORAL at 21:00

## 2019-04-05 RX ADMIN — ENOXAPARIN SODIUM 30 MG: 30 INJECTION SUBCUTANEOUS at 12:53

## 2019-04-05 RX ADMIN — METRONIDAZOLE 500 MG: 500 INJECTION, SOLUTION INTRAVENOUS at 08:10

## 2019-04-05 RX ADMIN — Medication 10 ML: at 10:57

## 2019-04-05 NOTE — PROGRESS NOTES
Pt with multiple request to lay on the floor stating,\"I want to die\". Reinsured pt multiple times. 0335Musa David (PCT) witness pt sliding herself out of bed to lay on the floor, pt refused to get back in and continued to lay on the floor. RN was notified of finding. Turn team and Charge nurse, and other RN came to help place pt back in bed. Pt continue to state that she want to die and she was tired. Recheck BP. BP remain elevated 225/70 was the lower. 65: Page  about elevated BP and pt sliding out of bed. Dr. Kena Burroughs stated that the medication needed time to work. Mention to MD that everything that was given didn't work and her BP is still elevated. Orders to give lopressor 5mg STAT. 
 
0502. BP starting to trend down. Pt currently resting in bed. New orders PRN Hydralazine

## 2019-04-05 NOTE — PROGRESS NOTES
Reason for Admission:    Abdominal pain and nausea RRAT Score:          11/0/0 Plan for utilizing home health: To be determined, anticipate she would go to  Health Care Unit. She lived in [address] [place] [address] Current Advanced Directive/Advance Care Plan: DDNR Likelihood of Readmission:  Moderate Transition of Care Plan:                   
 
Mg Garcia is a 80year old female to [facility] ED with abdominal pain. Hospitalist consulted, GI consult. IV Antibiotics to cover diverticulitis. Spoke with [facility] - [phone] Josee. No one at bedside. Cardiology consult and Neurology Consult for change in level of consciousness. Care Management Interventions PCP Verified by CM: Yes(Dr. Armando Mendes) Palliative Care Criteria Met (RRAT>21 & CHF Dx)?: No 
Transition of Care Consult (CM Consult): Discharge Planning(Resides at [facility].  **will likely need health care unit upon discharge**  RRAT 11/0/0) MyChart Signup: No 
Discharge Durable Medical Equipment: No 
Health Maintenance Reviewed: Yes Physical Therapy Consult: No 
Occupational Therapy Consult: No 
Speech Therapy Consult: No 
Current Support Network: Other(Independent Living - [facility], sees PCP in clinic) Plan discussed with Pt/Family/Caregiver: Yes(Ms. Bustillos is unable to participate in interview. Spoke with [facility], friend Kari Velazquez is not at hospital at this time.) Care Management will continue to follow for discharge planning/transition of care. Gonzalo Holliday, RN, BSN, [facility] Care Management [phone]

## 2019-04-05 NOTE — PROGRESS NOTES
Bedside shift change report given to [facility] (oncoming nurse) by Mamadou Jeffery RN (offgoing nurse). Report included the following information SBAR and Kardex.

## 2019-04-05 NOTE — PROGRESS NOTES
PCP Cross cover. Pt with elevated bp and now decreased responsiveness. Code S called. Hospitalist and Neurology to see.

## 2019-04-05 NOTE — PROGRESS NOTES
Patient apologized for being irritable earlier. She c/o being \"breathless\" at this time. Denies chest pain. She has no evidence of stroke. Will check chest xray and give her ativan. She states clearly she does not want to be resuscitated. So no code strokes or rapid response. Please call pcp ( Dr. Tammi Abebe this weekend).

## 2019-04-05 NOTE — ROUTINE PROCESS
RN responds to Code S. Patient was agitated and combative at approximately 0930. Patient then progressed to poor responsiveness. Eyes closed, groans and swings hands to physical stimulation. Withdraws all extremities from noxious stimuli. NIH =8. Patient able to move extremities spontaneously to painful stimuli, but minimal command following. Patient resists gravity briefly with extremities, but falls to bed. Patient will not open eyes to track. But pupils YESENIA. Patient groans to painful stimuli, but no purposeful exchange. CT/CTA ordred.

## 2019-04-05 NOTE — PROGRESS NOTES
[phone]- RN sent page to Dr Teresa Hinojosa infectious disease to discuss sigmoidoscopy reschedule. Awaiting return page. Srinivas Gagnon S Cool 94 with [facility] NP regarding sigmoidoscopy. Teresa Hinojosa MD would like patient to be cleared by neurology and cardiology prior to having procedure. 200- Paged Dr Robb Lea to discuss possible sigmoidoscopy. Tentative plan is to complete sigmoidoscopy inpatient on Monday.

## 2019-04-05 NOTE — PROGRESS NOTES
[address], [facility] [place] 
[phone] Discussed with Abdiel Anders, RN, patient has been refusing enemas. I spoke with patient, encouraging her to take enemas she replied \"shut-up and let me die\". I discussed with daughter Aydin Chang ([phone]) as well who still wants patient to have flex sig. If we can give enemas can proceed with flex sig as planned. Consider palliative care consult. Sarika Ansari NP 
 
 
GI Attending: We will check back on Monday to see if flex sigmoidoscopy is within patient's goals of care and if she is medically cleared for procedure. Please call with any questions. Please keep NPO after midnight on Sunday night if she agrees to have flex sig. Weekend team to see on request. 
 
Steven Penn.  Cece Dobbs MD

## 2019-04-05 NOTE — PROGRESS NOTES
6999: Page Dr. Lloyd Ricardo concerning pt elevated BP. Last check 205/70. Awaiting call back 36: Spoke with Dr. Lloyd Ricardo orders to give pt 5mg of Amlodipine. MD  Stated she is aware of pt Blood pressure and want to bring it down slowly. Will continue to monitor. 0130. Recheck BP, continue to be elevated page Dr. Michelle Cabral: New orders place for BP. See MAR  
 
0236: Bladder scan pt, reading only 137cc

## 2019-04-05 NOTE — PROGRESS NOTES
Medical Progress Note NAME: Keya Moore :  1924 MRM:  [id number] Date/Time: 2019  9:39 AM 
 
Problem List:  
Active Problems: 
  Abdominal pain, epigastric (4/3/2019) Abdominal pain (4/3/2019) Subjective:  
 
Patient denies pain. C/o she is tired and does not want to be examined. Past Medical History:  
Diagnosis Date  Arthritis HANDS  Chronic pain UPPER ABDOMEN  
 Elevated lipids 2011  Gastrointestinal disease Acid reflux  Gastrointestinal disorder Hiatial hernia  GERD (gastroesophageal reflux disease) 2011  
 HTN (hypertension) 2011  Hypothyroidism 2013  Macular degeneration  Pneumonia  Post herpetic neuralgia 2011  PVD (peripheral vascular disease) ([facility] [address] 2000  Thromboembolus ([facility] [address]  LT. LEG  Thyroid disease  Unspecified adverse effect of anesthesia DIFFICULTY AWAKENING, WAS COMBATIVE  
 
 
ROS: denies pain Objective:  
 
 
Vitals:  
 
 
  
Last 24hrs VS reviewed since prior progress note. Most recent are: 
 
Visit Vitals /71 Pulse 67 Temp 98.8 °F (37.1 °C) Resp 18 Ht 5' (1.524 m) Wt 118 lb 2.7 oz (53.6 kg) SpO2 99% BMI 23.08 kg/m² SpO2 Readings from Last 6 Encounters:  
19 99% 16 93% 13 97% 12 95% 12 99% 10/15/12 93% O2 Flow Rate (L/min): 3 l/min No intake or output data in the 24 hours ending 19 0939 Exam:  
 
General   Frail elderly wf 
Respiratory   Clear To Auscultation bilaterally - ' Cardiac- rrr Abdominal  Soft, non-tender, non-distended, positive bowel sounds, no hepatosplenomegaly Extremities  No clubbing, cyanosis, or edema. Pulses intact. Lab Data Reviewed: (see below) Medications Reviewed: (see below) 
 
______________________________________________________________________ Medications:  
 
Current Facility-Administered Medications Medication Dose Route Frequency  cloNIDine (CATAPRES) 0.3 mg/24 hr patch 1 Patch  1 Patch TransDERmal Q7D  
 hydrALAZINE (APRESOLINE) 20 mg/mL injection 10 mg  10 mg IntraVENous Q6H PRN  
 amLODIPine (NORVASC) tablet 10 mg  10 mg Oral DAILY  metroNIDAZOLE (FLAGYL) IVPB premix 500 mg  500 mg IntraVENous Q12H  
 atorvastatin (LIPITOR) tablet 40 mg  40 mg Oral QHS  aspirin chewable tablet 81 mg  81 mg Oral DAILY  escitalopram oxalate (LEXAPRO) tablet 10 mg  10 mg Oral DAILY  levothyroxine (SYNTHROID) tablet 50 mcg  50 mcg Oral ACB  losartan (COZAAR) tablet 100 mg  100 mg Oral DAILY  pantoprazole (PROTONIX) tablet 40 mg  40 mg Oral ACB  oxybutynin (DITROPAN) tablet 5 mg  5 mg Oral QHS  temazepam (RESTORIL) capsule 15 mg  15 mg Oral QHS  
 0.9% sodium chloride infusion  50 mL/hr IntraVENous CONTINUOUS  
 sodium chloride (NS) flush 5-40 mL  5-40 mL IntraVENous Q8H  
 sodium chloride (NS) flush 5-40 mL  5-40 mL IntraVENous PRN  
 cefTRIAXone (ROCEPHIN) 1 g in 0.9% sodium chloride (MBP/ADV) 50 mL  1 g IntraVENous Q24H Lab Review:  
 
Recent Labs 04/04/19 
0450 04/03/19 
1022 WBC 17.6* 9.9 HGB 10.0* 9.3* HCT 31.4* 30.6*  247 Recent Labs 04/04/19 
0450 04/03/19 
1022  142  
K 3.7 3.3*  
* 107 CO2 24 26 * 114* BUN 30* 27* CREA 0.83 0.89 CA 8.5 8.8 MG  --  1.9 ALB  --  3.3* TBILI  --  0.4 SGOT  --  22 ALT  --  15 No results found for: Amandeep Scruggs No results for input(s): PH, PCO2, PO2, HCO3, FIO2 in the last 72 hours. No results for input(s): INR in the last 72 hours. No lab exists for component: INREXT Other pertinent lab: NA Assessment:  
 
Patient Active Problem List  
Diagnosis Code  GERD (gastroesophageal reflux disease) K21.9  Elevated lipids E78.5  Post herpetic neuralgia B02.29  
 Encounter for long-term (current) drug use Z79.899  Hiatal hernia with gastroesophageal reflux K21.9, K44.9  Syncope R55  Protein-calorie malnutrition, mild (HCC) E44.1  Hypothyroidism due to acquired atrophy of thyroid E03.4  Essential hypertension I10  Abdominal pain, epigastric R10.13  Abdominal pain R10.9 Plan: 1. Elevated white count with abd pain- for colonoscopy today. 2. Elevated bp- continue to work on decreasing. Refractory after multiple meds    
 
        
___________________________________________________ Attending Physician: Juan Jose MD

## 2019-04-05 NOTE — PROGRESS NOTES
Spiritual Care Assessment/Progress Note [address] 
 
 
NAME: Judge Paris      MRN: [id number] AGE: 80 y.o. SEX: female Hindu Affiliation: [facility] Language: [place] 4/5/2019     Total Time (in minutes): 15 Spiritual Assessment [facility] in [phone] through conversation with: 
  
    []Patient        [] Family    [] Friend(s) Reason for Consult: Rapid response team  
 
Spiritual beliefs: (Please include comment if needed) 
   [] Identifies with a savanah tradition:     
   [] Supported by a savanah community:        
   [] Claims no spiritual orientation:       
   [] Seeking spiritual identity:            
   [] Adheres to an individual form of spirituality:       
   [x] Not able to assess:                   
 
    
Identified resources for coping:  
   [] Prayer                           
   [] Music                  [] Guided Imagery 
   [] Family/friends                 [] Pet visits [] Devotional reading                         [] Unknown 
   [] Other:                                     
 
 
Interventions offered during this visit: (See comments for more details) Patient Interventions: Initial visit Plan of Care: 
 
 [] Support spiritual and/or cultural needs  
 [] Support AMD and/or advance care planning process    
 [] Support grieving process 
 [] Coordinate Rites and/or Rituals  
 [] Coordination with community clergy [] No spiritual needs identified at this time 
 [] Detailed Plan of Care below (See Comments)  [] Make referral to Music Therapy 
[] Make referral to Pet Therapy    
[] Make referral to Addiction services 
[] Make referral to [facility] 
[] Make referral to Spiritual Care Partner 
[] No future visits requested       
[x] Follow up visits as needed Responded to a RRT which changed into a Code Stroke. No family present. Chaplains will continue to support as needed.  
Chaplain Nadeem, MDiv, MS, [facility] 
 [place] [phone])

## 2019-04-05 NOTE — PROGRESS NOTES
10:40- RN responded to Code S. Pt became unresponsive while trying to get out of bed. 10:50- Pt taken to CT scan, does not follow commands, VSS, pt maintaining airway. 11:20- Pt back in room. VSS, tele-neurology on monitor. No interventions at the time. Hospitalist at bedside.

## 2019-04-05 NOTE — CONSULTS
History and Physical    Primary Care Provider: Vimal Hastings MD    Subjective:     CC: episode of unresponsiveness- consult/taking over care    Chalino Corrigan is a 80 y.o. female with PMH of  HTN, GERD, hypthyroidism . P/w abdominal pain 2days ago, lost her cat last week and has been sad. Since admission had CT showed area of concer- narrowing, seen by GI plans for flex sigmoidoscopy was today, patient has been agitated all night long, code [place] for agitation was called this morning and shortly after that code stroke was called as she lied back in bed and was [place]. Currently had CT/CTA and regaining her alertness, speaking, lethargic. Moving all extremities    Review of Systems:  Further 10 point review of systems is benign. A comprehensive review of systems was negative except for that written in the History of Present Illness. Past Medical History:   Diagnosis Date    Arthritis     HANDS    Chronic pain     UPPER ABDOMEN    Elevated lipids 5/31/2011    Gastrointestinal disease     Acid reflux    Gastrointestinal disorder     Hiatial hernia    GERD (gastroesophageal reflux disease) 5/31/2011    HTN (hypertension) 5/31/2011    Hypothyroidism 2/11/2013    Macular degeneration     Pneumonia     Post herpetic neuralgia 5/31/2011    PVD (peripheral vascular disease) ([facility] [address] 03/2000    Thromboembolus ([facility] [address] 2001    LT. LEG    Thyroid disease     Unspecified adverse effect of anesthesia     DIFFICULTY AWAKENING, WAS COMBATIVE      Past Surgical History:   Procedure Laterality Date    HX APPENDECTOMY      HX CARPAL TUNNEL RELEASE  3/2000    HX CATARACT REMOVAL      WILFREDO. W/ LENS IMPLANT    HX CHOLECYSTECTOMY      HX GI      COLONOSCOPY AND ENDOSCOPY    HX GI  12/4/12    DaVinci Hiatal Hernia Repair with Mesh and Toupet Fundoplication    HX MOHS PROCEDURES      LT.    HX ORTHOPAEDIC      WILFREDO.  CARPAL TUNNEL    HX TONSILLECTOMY       Prior to Admission medications    Medication Sig Start Date End Date Taking? Authorizing Provider   cloNIDine (CATAPRES) 0.1 mg/24 hr ptwk APPLY 1 PATCH TOPICALLY. CHANGE WEEKLY 19  Yes Asher Moreno MD   levothyroxine (SYNTHROID) 50 mcg tablet Take 1 Tab by mouth Daily (before breakfast). 18  Yes Asher Moreno MD   temazepam (RESTORIL) 15 mg capsule TAKE ONE (1) CAPSULE BY MOUTH AT BEDTIME AS NEEDED FOR INSOMNIA. 18  Yes Asher Moreno MD   losartan (COZAAR) 100 mg tablet Take 100 mg by mouth daily. Yes Provider, Historical   omeprazole (PRILOSEC) 40 mg capsule Take 40 mg by mouth daily. Yes Provider, Historical   atorvastatin (LIPITOR) 40 mg tablet TAKE ONE (1) TABLET BY MOUTH AT BEDTIME. 18  Yes Asher Moreno MD   escitalopram oxalate (LEXAPRO) 10 mg tablet TAKE ONE (1) TABLET BY MOUTH EVERY DAY. . 18  Yes Asher Moreno MD   oxybutynin (DITROPAN) 5 mg tablet TAKE ONE (1) TABLET BY MOUTH AT BEDTIME. 18  Yes Asher Moreno MD   aspirin 81 mg chewable tablet Take 81 mg by mouth daily. Yes Provider, Historical   vit A/vit C/vit E/zinc/copper (PRESERVISION AREDS PO) Take  by mouth daily. Provider, Historical   amLODIPine (NORVASC) 2.5 mg tablet Take 1 Tab by mouth daily. 10/8/18   Asher Moreno MD   hydroCHLOROthiazide (MICROZIDE) 12.5 mg capsule TAKE ONE (1) CAPSULE BY MOUTH EVERY DAY. 18   Asher Moreno MD   FOLIC ACID PO Take 553 mg by mouth. Other, MD Oscar     Allergies   Allergen Reactions    Bactrim [Sulfamethoprim Ds] Other (comments)    Darvocet A500 [Propoxyphene N-Acetaminophen] Nausea and Vomiting    Other Medication Rash     p-phenylenediamine    Pcn [Penicillins] Unknown (comments)      Family History   Problem Relation Age of Onset    Heart Disease Brother          age 57's    Cancer Brother         LUNG    Heart Disease Brother          age 66's      SOCIAL HISTORY:  Patient resides at Home.    Smoking history: -ve  Alcohol history: -ve  Objective:     Physical Exam:   General:  Alert, oriented, very lethargic, frail, elderly [employer]  HEENT:  [place] conjunctivae, PERRL, hearing intact to voice, MM moist  Neck:  Supple, without masses, thyroid non-tender  Card:  S1, S2 without murmurs, good peripheral perfusion,   Resp:  No accessory muscle use, clear breath sounds without wheezes or rhonchi  Abd:  Soft, tender + mostly lower abdo, non-distended, BS+, no masses  Lymph:  No cervical or inguinal adenopathy  Extremities:  No cyanosis or clubbing, no significant edema  Skin:  No rashes or ulcers, skin turgor is good  Neuro:  Grossly normal, no focal neuro deficits, CNs intact, follows commands   Psych:  Good insight, oriented to person, place and time. ECG:  I personally reviewed: no st/t changes that are concerning for mi    Data Review: All diagnostic labs and studies have been reviewed by myself personally. Imaging I personally reviewed: CT/CTA no major vessel blockages, no ICH  Assessment:     Active Problems:    Abdominal pain, epigastric (4/3/2019)      Abdominal pain (4/3/2019)      Plan:     #. Unresponsiveness: code stroke was called, hospitalist team will take over care from PCP  - spoke to [facility] Neurology, Dr Sumit Carty, has been agitated all night and this am, Code [place] for agitation/aggressive behaviour was called just before code stroke. - getting CT/CTA head and neck, results not concerning, no blocked large vessels,   - Spoke to tele Neurologist, who supervised examination and not concerning for acute CVA. And even if it was she wouldn't have been candidate for tPA since was agitated all night, and last normal yesterday. #. Colitis: elevated WCC, GI following, on Abx, plans for Flex sigmoidoscopy, can go ahead today if GI feel comfortable with that. #. HTN: home regimen  #.  Hypothyroidism: home regimen    Patient's Baseline: ambulates with independent- AAox3   DVT px:  lovenox  Code status: DNR  Dispo: TBD    Signed By: Emerson Guerra MD     April 5, 2019

## 2019-04-06 PROCEDURE — 74011250637 HC RX REV CODE- 250/637: Performed by: INTERNAL MEDICINE

## 2019-04-06 PROCEDURE — 74011000258 HC RX REV CODE- 258: Performed by: FAMILY MEDICINE

## 2019-04-06 PROCEDURE — 74011250636 HC RX REV CODE- 250/636: Performed by: INTERNAL MEDICINE

## 2019-04-06 PROCEDURE — 74011250637 HC RX REV CODE- 250/637: Performed by: FAMILY MEDICINE

## 2019-04-06 PROCEDURE — 65270000032 HC RM SEMIPRIVATE

## 2019-04-06 PROCEDURE — 74011250636 HC RX REV CODE- 250/636: Performed by: FAMILY MEDICINE

## 2019-04-06 RX ORDER — ONDANSETRON 2 MG/ML
4 INJECTION INTRAMUSCULAR; INTRAVENOUS
Status: DISCONTINUED | OUTPATIENT
Start: 2019-04-06 | End: 2019-04-09 | Stop reason: HOSPADM

## 2019-04-06 RX ORDER — ONDANSETRON 2 MG/ML
INJECTION INTRAMUSCULAR; INTRAVENOUS
Status: DISPENSED
Start: 2019-04-06 | End: 2019-04-07

## 2019-04-06 RX ADMIN — METRONIDAZOLE 500 MG: 500 INJECTION, SOLUTION INTRAVENOUS at 19:47

## 2019-04-06 RX ADMIN — CEFTRIAXONE 1 G: 1 INJECTION, POWDER, FOR SOLUTION INTRAMUSCULAR; INTRAVENOUS at 19:13

## 2019-04-06 RX ADMIN — ESCITALOPRAM OXALATE 10 MG: 10 TABLET ORAL at 08:38

## 2019-04-06 RX ADMIN — HYDRALAZINE HYDROCHLORIDE: 20 INJECTION INTRAMUSCULAR; INTRAVENOUS at 13:19

## 2019-04-06 RX ADMIN — ATORVASTATIN CALCIUM 40 MG: 40 TABLET, FILM COATED ORAL at 21:14

## 2019-04-06 RX ADMIN — LORAZEPAM 0.5 MG: 2 INJECTION INTRAMUSCULAR; INTRAVENOUS at 15:08

## 2019-04-06 RX ADMIN — LORAZEPAM 0.5 MG: 2 INJECTION INTRAMUSCULAR; INTRAVENOUS at 00:07

## 2019-04-06 RX ADMIN — OXYBUTYNIN CHLORIDE 5 MG: 5 TABLET ORAL at 21:14

## 2019-04-06 RX ADMIN — Medication 10 ML: at 21:17

## 2019-04-06 RX ADMIN — ONDANSETRON 4 MG: 2 INJECTION INTRAMUSCULAR; INTRAVENOUS at 14:06

## 2019-04-06 RX ADMIN — AMLODIPINE BESYLATE 10 MG: 5 TABLET ORAL at 08:38

## 2019-04-06 RX ADMIN — ASPIRIN 81 MG CHEWABLE TABLET 81 MG: 81 TABLET CHEWABLE at 08:38

## 2019-04-06 RX ADMIN — PANTOPRAZOLE SODIUM 40 MG: 40 TABLET, DELAYED RELEASE ORAL at 07:08

## 2019-04-06 RX ADMIN — TEMAZEPAM 15 MG: 15 CAPSULE ORAL at 21:14

## 2019-04-06 RX ADMIN — LEVOTHYROXINE SODIUM 50 MCG: 50 TABLET ORAL at 07:08

## 2019-04-06 RX ADMIN — LOSARTAN POTASSIUM 100 MG: 50 TABLET ORAL at 08:37

## 2019-04-06 RX ADMIN — ENOXAPARIN SODIUM 30 MG: 30 INJECTION SUBCUTANEOUS at 13:13

## 2019-04-06 RX ADMIN — METRONIDAZOLE 500 MG: 500 INJECTION, SOLUTION INTRAVENOUS at 07:08

## 2019-04-06 RX ADMIN — Medication 10 ML: at 13:14

## 2019-04-06 NOTE — PROGRESS NOTES
Bedside shift change report given to Vielka RN (oncoming nurse) by Javad Cutler RN (offgoing nurse). Report included the following information SBAR, Kardex, Procedure Summary, Intake/Output, MAR and Recent Results.

## 2019-04-06 NOTE — PROGRESS NOTES
Bedside and Verbal shift change report given to 65 Tiny Mcdonald (oncoming nurse) by Bennett Lou (offgoing nurse). Report included the following information SBAR.

## 2019-04-06 NOTE — PROGRESS NOTES
Patient doing better this morning. At 1315, call bell was answered and patient nauseous. Vitals were taken and Blood Pressure was 221/77. Hydralazine given and blood pressure taken again. Blood pressure came down to 148/62 at 1345. Called MD to get nausea medication for patient. Nausea medication (Zofran) given at 1406.

## 2019-04-06 NOTE — PROGRESS NOTES
Tierra Duran, Elodia Bangura and AlishaAdmit Date: 4/3/2019 Subjective:  
 
Patient feeling well this AM. No more abd pain. No SOB. WBC has normalized. AF,BP is much improved from earlier in admission. .  
 
 
Current Facility-Administered Medications Medication Dose Route Frequency  cloNIDine (CATAPRES) 0.3 mg/24 hr patch 1 Patch  1 Patch TransDERmal Q7D  
 hydrALAZINE (APRESOLINE) 20 mg/mL injection 10 mg  10 mg IntraVENous Q6H PRN  
 amLODIPine (NORVASC) tablet 10 mg  10 mg Oral DAILY  enoxaparin (LOVENOX) injection 30 mg  30 mg SubCUTAneous Q24H  
 LORazepam (ATIVAN) injection 0.5 mg  0.5 mg IntraVENous Q4H PRN  
 metroNIDAZOLE (FLAGYL) IVPB premix 500 mg  500 mg IntraVENous Q12H  
 atorvastatin (LIPITOR) tablet 40 mg  40 mg Oral QHS  aspirin chewable tablet 81 mg  81 mg Oral DAILY  escitalopram oxalate (LEXAPRO) tablet 10 mg  10 mg Oral DAILY  levothyroxine (SYNTHROID) tablet 50 mcg  50 mcg Oral ACB  losartan (COZAAR) tablet 100 mg  100 mg Oral DAILY  pantoprazole (PROTONIX) tablet 40 mg  40 mg Oral ACB  oxybutynin (DITROPAN) tablet 5 mg  5 mg Oral QHS  temazepam (RESTORIL) capsule 15 mg  15 mg Oral QHS  sodium chloride (NS) flush 5-40 mL  5-40 mL IntraVENous Q8H  
 sodium chloride (NS) flush 5-40 mL  5-40 mL IntraVENous PRN  
 cefTRIAXone (ROCEPHIN) 1 g in 0.9% sodium chloride (MBP/ADV) 50 mL  1 g IntraVENous Q24H Objective:  
 
Patient Vitals for the past 8 hrs: 
 BP Temp Pulse Resp SpO2  
04/06/19 0802 161/70 98.5 °F (36.9 °C) 84 16 95 % 04/06/19 0357 152/59 97.7 °F (36.5 °C) 90 16 93 % No intake/output data recorded. 04/04 1901 - 04/06 0700 In: 120 [P.O.:120] Out: - Physical Exam: Lungs: clear to auscultation bilaterally Heart: regular rate and rhythm, S1, S2 normal, no murmur, click, rub or gallop Abdomen: soft, non-tender. Bowel sounds normal. No masses,  no organomegaly Data Review Recent Results (from the past 24 hour(s)) GLUCOSE, POC Collection Time: 04/05/19 10:12 AM  
Result Value Ref Range Glucose (POC) 103 (H) 65 - 100 mg/dL Performed by Eloise Lowe   
EKG, 12 LEAD, INITIAL Collection Time: 04/05/19 10:20 AM  
Result Value Ref Range Ventricular Rate 67 BPM  
 Atrial Rate 67 BPM  
 P-R Interval 164 ms QRS Duration 110 ms  
 Q-T Interval 430 ms QTC Calculation (Bezet) 454 ms Calculated P Axis 16 degrees Calculated R Axis -56 degrees Calculated T Axis -36 degrees Diagnosis Normal sinus rhythm Left anterior fascicular block Nonspecific T wave abnormality Abnormal ECG When compared with ECG of 17-NOV-2012 21:58, Inverted T waves have replaced nonspecific T wave abnormality in Inferior  
leads Nonspecific T wave abnormality, worse in Lateral leads Confirmed by Shaquille Scott M.D., Betito Santana ([id number]) on 4/5/2019 12:18:01 PM 
  
CBC WITH AUTOMATED DIFF Collection Time: 04/05/19 10:50 AM  
Result Value Ref Range WBC 9.2 3.6 - 11.0 K/uL  
 RBC 3.77 (L) 3.80 - 5.20 M/uL HGB 9.9 (L) 11.5 - 16.0 g/dL HCT 31.2 (L) 35.0 - 47.0 % MCV 82.8 80.0 - 99.0 FL  
 MCH 26.3 26.0 - 34.0 PG  
 MCHC 31.7 30.0 - 36.5 g/dL  
 RDW 16.7 (H) 11.5 - 14.5 % PLATELET 607 334 - 738 K/uL MPV 10.6 8.9 - 12.9 FL  
 NRBC 0.0 0  WBC ABSOLUTE NRBC 0.00 0.00 - 0.01 K/uL NEUTROPHILS 85 (H) 32 - 75 % LYMPHOCYTES 5 (L) 12 - 49 % MONOCYTES 10 5 - 13 % EOSINOPHILS 0 0 - 7 % BASOPHILS 0 0 - 1 % IMMATURE GRANULOCYTES 0 0.0 - 0.5 % ABS. NEUTROPHILS 7.8 1.8 - 8.0 K/UL  
 ABS. LYMPHOCYTES 0.5 (L) 0.8 - 3.5 K/UL  
 ABS. MONOCYTES 0.9 0.0 - 1.0 K/UL  
 ABS. EOSINOPHILS 0.0 0.0 - 0.4 K/UL  
 ABS. BASOPHILS 0.0 0.0 - 0.1 K/UL  
 ABS. IMM. GRANS. 0.0 0.00 - 0.04 K/UL  
 DF SMEAR SCANNED    
 RBC COMMENTS ANISOCYTOSIS 
1+ Assessment:  
 
Principal Problem: 
  Colitis (4/5/2019) Active Problems: 
  Essential hypertension (9/4/2015) Abdominal pain, epigastric (4/3/2019) Abdominal pain (4/3/2019) Plan:  
 
1) Cont IV Abx 
2) No evidence of neurologic event

## 2019-04-06 NOTE — PROGRESS NOTES
0900- Assessed patient who appeared agitated and refused morning oral medications and enema for procedure. /70. Was confused and combative when Code [place] was called. Pt returned to bed. 1000- RN gave IV Hydralyzine PRN. 1009- RN called into pt room for pt agitation, trying to get out of bed. Pt became unresponsive. Code stroke called. /63 
 
1155- Pt returned to floor. RN assessed. Patient A&O x3, calm/cooperative. /64.

## 2019-04-06 NOTE — ROUTINE PROCESS
Bedside and Verbal shift change report given to Vielka RN (oncoming nurse) by Vero Canada RN (offgoing nurse). Report included the following information SBAR, Kardex, Intake/Output, MAR and Recent Results.

## 2019-04-07 LAB
ANION GAP SERPL CALC-SCNC: 10 MMOL/L (ref 5–15)
BASOPHILS # BLD: 0 K/UL (ref 0–0.1)
BASOPHILS NFR BLD: 0 % (ref 0–1)
BUN SERPL-MCNC: 49 MG/DL (ref 6–20)
BUN/CREAT SERPL: 52 (ref 12–20)
CALCIUM SERPL-MCNC: 8.9 MG/DL (ref 8.5–10.1)
CHLORIDE SERPL-SCNC: 107 MMOL/L (ref 97–108)
CO2 SERPL-SCNC: 21 MMOL/L (ref 21–32)
CREAT SERPL-MCNC: 0.94 MG/DL (ref 0.55–1.02)
DIFFERENTIAL METHOD BLD: ABNORMAL
EOSINOPHIL # BLD: 0 K/UL (ref 0–0.4)
EOSINOPHIL NFR BLD: 0 % (ref 0–7)
ERYTHROCYTE [DISTWIDTH] IN BLOOD BY AUTOMATED COUNT: 16.8 % (ref 11.5–14.5)
GLUCOSE SERPL-MCNC: 96 MG/DL (ref 65–100)
HCT VFR BLD AUTO: 36.5 % (ref 35–47)
HGB BLD-MCNC: 11.3 G/DL (ref 11.5–16)
IMM GRANULOCYTES # BLD AUTO: 0 K/UL (ref 0–0.04)
IMM GRANULOCYTES NFR BLD AUTO: 0 % (ref 0–0.5)
LYMPHOCYTES # BLD: 0.4 K/UL (ref 0.8–3.5)
LYMPHOCYTES NFR BLD: 3 % (ref 12–49)
MCH RBC QN AUTO: 25.7 PG (ref 26–34)
MCHC RBC AUTO-ENTMCNC: 31 G/DL (ref 30–36.5)
MCV RBC AUTO: 83 FL (ref 80–99)
MONOCYTES # BLD: 1.6 K/UL (ref 0–1)
MONOCYTES NFR BLD: 14 % (ref 5–13)
NEUTS SEG # BLD: 9.2 K/UL (ref 1.8–8)
NEUTS SEG NFR BLD: 82 % (ref 32–75)
NRBC # BLD: 0 K/UL (ref 0–0.01)
NRBC BLD-RTO: 0 PER 100 WBC
PLATELET # BLD AUTO: 330 K/UL (ref 150–400)
PMV BLD AUTO: 9.9 FL (ref 8.9–12.9)
POTASSIUM SERPL-SCNC: 3.6 MMOL/L (ref 3.5–5.1)
RBC # BLD AUTO: 4.4 M/UL (ref 3.8–5.2)
SODIUM SERPL-SCNC: 138 MMOL/L (ref 136–145)
WBC # BLD AUTO: 11.2 K/UL (ref 3.6–11)

## 2019-04-07 PROCEDURE — 74011250637 HC RX REV CODE- 250/637: Performed by: INTERNAL MEDICINE

## 2019-04-07 PROCEDURE — 74011250636 HC RX REV CODE- 250/636: Performed by: FAMILY MEDICINE

## 2019-04-07 PROCEDURE — 80048 BASIC METABOLIC PNL TOTAL CA: CPT

## 2019-04-07 PROCEDURE — 74011000258 HC RX REV CODE- 258: Performed by: FAMILY MEDICINE

## 2019-04-07 PROCEDURE — 74011250637 HC RX REV CODE- 250/637: Performed by: FAMILY MEDICINE

## 2019-04-07 PROCEDURE — 94762 N-INVAS EAR/PLS OXIMTRY CONT: CPT

## 2019-04-07 PROCEDURE — 74011250636 HC RX REV CODE- 250/636: Performed by: INTERNAL MEDICINE

## 2019-04-07 PROCEDURE — 85025 COMPLETE CBC W/AUTO DIFF WBC: CPT

## 2019-04-07 PROCEDURE — 36415 COLL VENOUS BLD VENIPUNCTURE: CPT

## 2019-04-07 PROCEDURE — 65270000032 HC RM SEMIPRIVATE

## 2019-04-07 RX ADMIN — LEVOTHYROXINE SODIUM 50 MCG: 50 TABLET ORAL at 08:44

## 2019-04-07 RX ADMIN — AMLODIPINE BESYLATE 10 MG: 5 TABLET ORAL at 08:45

## 2019-04-07 RX ADMIN — Medication 10 ML: at 14:00

## 2019-04-07 RX ADMIN — OXYBUTYNIN CHLORIDE 5 MG: 5 TABLET ORAL at 21:19

## 2019-04-07 RX ADMIN — ENOXAPARIN SODIUM 30 MG: 30 INJECTION SUBCUTANEOUS at 13:52

## 2019-04-07 RX ADMIN — PANTOPRAZOLE SODIUM 40 MG: 40 TABLET, DELAYED RELEASE ORAL at 08:44

## 2019-04-07 RX ADMIN — Medication 10 ML: at 06:00

## 2019-04-07 RX ADMIN — TEMAZEPAM 15 MG: 15 CAPSULE ORAL at 21:19

## 2019-04-07 RX ADMIN — METRONIDAZOLE 500 MG: 500 INJECTION, SOLUTION INTRAVENOUS at 07:16

## 2019-04-07 RX ADMIN — ESCITALOPRAM OXALATE 10 MG: 10 TABLET ORAL at 08:45

## 2019-04-07 RX ADMIN — LOSARTAN POTASSIUM 100 MG: 50 TABLET ORAL at 08:45

## 2019-04-07 RX ADMIN — CEFTRIAXONE 1 G: 1 INJECTION, POWDER, FOR SOLUTION INTRAMUSCULAR; INTRAVENOUS at 19:00

## 2019-04-07 RX ADMIN — ASPIRIN 81 MG CHEWABLE TABLET 81 MG: 81 TABLET CHEWABLE at 08:45

## 2019-04-07 RX ADMIN — METRONIDAZOLE 500 MG: 500 INJECTION, SOLUTION INTRAVENOUS at 19:38

## 2019-04-07 RX ADMIN — ATORVASTATIN CALCIUM 40 MG: 40 TABLET, FILM COATED ORAL at 21:19

## 2019-04-07 NOTE — PROGRESS NOTES
Bedside and Verbal shift change report given to [facility] (oncoming nurse) by Shama Harp RN (offgoing nurse). Report included the following information SBAR.

## 2019-04-07 NOTE — PROGRESS NOTES
Bedside and Verbal shift change report given to Rochelle Vilchis RN (oncoming nurse) by DARNELL Lo (offgoing nurse). Report included the following information SBAR, Intake/Output, MAR and Recent Results.

## 2019-04-07 NOTE — PROGRESS NOTES
0930: Got patient up in chair. Patient resting quietly in chair. Not tolerating food well, but wanting ice cold water frequently. 1600:Patient more alert today. Patient drinking more fluids and eating some. There have been visitors today which has increased her mood. Patient voiding.

## 2019-04-07 NOTE — PROGRESS NOTES
Tierra Tello, Sharyn Szymanski, and AlishaAdmit Date: 4/3/2019 Subjective:  
 
Patient with no abd pain or fever. She just does not want to swallow food. She is drinking fluids well however. .  
 
 
Current Facility-Administered Medications Medication Dose Route Frequency  ondansetron (ZOFRAN) injection 4 mg  4 mg IntraVENous Q6H PRN  
 cloNIDine (CATAPRES) 0.3 mg/24 hr patch 1 Patch  1 Patch TransDERmal Q7D  
 hydrALAZINE (APRESOLINE) 20 mg/mL injection 10 mg  10 mg IntraVENous Q6H PRN  
 amLODIPine (NORVASC) tablet 10 mg  10 mg Oral DAILY  enoxaparin (LOVENOX) injection 30 mg  30 mg SubCUTAneous Q24H  
 LORazepam (ATIVAN) injection 0.5 mg  0.5 mg IntraVENous Q4H PRN  
 metroNIDAZOLE (FLAGYL) IVPB premix 500 mg  500 mg IntraVENous Q12H  
 atorvastatin (LIPITOR) tablet 40 mg  40 mg Oral QHS  aspirin chewable tablet 81 mg  81 mg Oral DAILY  escitalopram oxalate (LEXAPRO) tablet 10 mg  10 mg Oral DAILY  levothyroxine (SYNTHROID) tablet 50 mcg  50 mcg Oral ACB  losartan (COZAAR) tablet 100 mg  100 mg Oral DAILY  pantoprazole (PROTONIX) tablet 40 mg  40 mg Oral ACB  oxybutynin (DITROPAN) tablet 5 mg  5 mg Oral QHS  temazepam (RESTORIL) capsule 15 mg  15 mg Oral QHS  sodium chloride (NS) flush 5-40 mL  5-40 mL IntraVENous Q8H  
 sodium chloride (NS) flush 5-40 mL  5-40 mL IntraVENous PRN  
 cefTRIAXone (ROCEPHIN) 1 g in 0.9% sodium chloride (MBP/ADV) 50 mL  1 g IntraVENous Q24H Objective:  
 
Patient Vitals for the past 8 hrs: 
 BP Temp Pulse Resp SpO2  
04/07/19 0851 156/53 98.5 °F (36.9 °C) 69 18 95 % 04/07/19 0336 161/70 98.8 °F (37.1 °C) 75 20 97 % No intake/output data recorded. 04/05 1901 - 04/07 0700 In: -  
Out: 20 Physical Exam: Lungs: clear to auscultation bilaterally Heart: regular rate and rhythm, S1, S2 normal, no murmur, click, rub or gallop Abdomen: soft, non-tender. Bowel sounds normal. No masses,  no organomegaly Data Review No results found for this or any previous visit (from the past 24 hour(s)). Assessment:  
 
Principal Problem: 
  Colitis (4/5/2019) Active Problems: 
  Essential hypertension (9/4/2015) Abdominal pain, epigastric (4/3/2019) Abdominal pain (4/3/2019) Plan:  
 
1) Cont IV Abx 
2) IN chair as reji 3) labs today 4) ?  For flex sig in AM?

## 2019-04-08 PROBLEM — J18.9 PNEUMONIA DUE TO INFECTIOUS ORGANISM: Status: ACTIVE | Noted: 2019-04-08

## 2019-04-08 PROBLEM — K56.699 SIGMOID STRICTURE (HCC): Status: ACTIVE | Noted: 2019-04-08

## 2019-04-08 PROCEDURE — 74011250636 HC RX REV CODE- 250/636: Performed by: FAMILY MEDICINE

## 2019-04-08 PROCEDURE — 77030011256 HC DRSG MEPILEX <16IN NO BORD MOLN -A

## 2019-04-08 PROCEDURE — 0DBK8ZX EXCISION OF ASCENDING COLON, VIA NATURAL OR ARTIFICIAL OPENING ENDOSCOPIC, DIAGNOSTIC: ICD-10-PCS | Performed by: INTERNAL MEDICINE

## 2019-04-08 PROCEDURE — 74011250636 HC RX REV CODE- 250/636: Performed by: INTERNAL MEDICINE

## 2019-04-08 PROCEDURE — 76060000032 HC ANESTHESIA 0.5 TO 1 HR: Performed by: INTERNAL MEDICINE

## 2019-04-08 PROCEDURE — 74011250637 HC RX REV CODE- 250/637: Performed by: FAMILY MEDICINE

## 2019-04-08 PROCEDURE — 74011250637 HC RX REV CODE- 250/637: Performed by: INTERNAL MEDICINE

## 2019-04-08 PROCEDURE — 88305 TISSUE EXAM BY PATHOLOGIST: CPT

## 2019-04-08 PROCEDURE — 77030020186 HC BOOT HL PROTCT SAGE -B

## 2019-04-08 PROCEDURE — 77030027957 HC TBNG IRR ENDOGTR BUSS -B: Performed by: INTERNAL MEDICINE

## 2019-04-08 PROCEDURE — 74011000258 HC RX REV CODE- 258: Performed by: FAMILY MEDICINE

## 2019-04-08 PROCEDURE — 77030013992 HC SNR POLYP ENDOSC BSC -B: Performed by: INTERNAL MEDICINE

## 2019-04-08 PROCEDURE — 65270000032 HC RM SEMIPRIVATE

## 2019-04-08 PROCEDURE — 74011250636 HC RX REV CODE- 250/636

## 2019-04-08 PROCEDURE — 76040000007: Performed by: INTERNAL MEDICINE

## 2019-04-08 RX ORDER — LIDOCAINE HYDROCHLORIDE 20 MG/ML
INJECTION, SOLUTION EPIDURAL; INFILTRATION; INTRACAUDAL; PERINEURAL AS NEEDED
Status: DISCONTINUED | OUTPATIENT
Start: 2019-04-08 | End: 2019-04-08 | Stop reason: HOSPADM

## 2019-04-08 RX ORDER — ATROPINE SULFATE 0.1 MG/ML
0.5 INJECTION INTRAVENOUS
Status: DISCONTINUED | OUTPATIENT
Start: 2019-04-08 | End: 2019-04-08 | Stop reason: HOSPADM

## 2019-04-08 RX ORDER — DIPHENHYDRAMINE HYDROCHLORIDE 50 MG/ML
50 INJECTION, SOLUTION INTRAMUSCULAR; INTRAVENOUS ONCE
Status: DISCONTINUED | OUTPATIENT
Start: 2019-04-08 | End: 2019-04-08 | Stop reason: HOSPADM

## 2019-04-08 RX ORDER — SODIUM CHLORIDE 9 MG/ML
INJECTION, SOLUTION INTRAVENOUS
Status: DISCONTINUED | OUTPATIENT
Start: 2019-04-08 | End: 2019-04-08 | Stop reason: HOSPADM

## 2019-04-08 RX ORDER — FENTANYL CITRATE 50 UG/ML
100 INJECTION, SOLUTION INTRAMUSCULAR; INTRAVENOUS
Status: DISCONTINUED | OUTPATIENT
Start: 2019-04-08 | End: 2019-04-08 | Stop reason: HOSPADM

## 2019-04-08 RX ORDER — FLUMAZENIL 0.1 MG/ML
0.2 INJECTION INTRAVENOUS
Status: DISCONTINUED | OUTPATIENT
Start: 2019-04-08 | End: 2019-04-08 | Stop reason: HOSPADM

## 2019-04-08 RX ORDER — NALOXONE HYDROCHLORIDE 0.4 MG/ML
0.4 INJECTION, SOLUTION INTRAMUSCULAR; INTRAVENOUS; SUBCUTANEOUS
Status: DISCONTINUED | OUTPATIENT
Start: 2019-04-08 | End: 2019-04-08 | Stop reason: HOSPADM

## 2019-04-08 RX ORDER — SODIUM CHLORIDE AND POTASSIUM CHLORIDE .9; .15 G/100ML; G/100ML
SOLUTION INTRAVENOUS CONTINUOUS
Status: DISCONTINUED | OUTPATIENT
Start: 2019-04-08 | End: 2019-04-09 | Stop reason: HOSPADM

## 2019-04-08 RX ORDER — EPINEPHRINE 0.1 MG/ML
1 INJECTION INTRACARDIAC; INTRAVENOUS
Status: DISCONTINUED | OUTPATIENT
Start: 2019-04-08 | End: 2019-04-08 | Stop reason: HOSPADM

## 2019-04-08 RX ORDER — SODIUM CHLORIDE 0.9 % (FLUSH) 0.9 %
5-40 SYRINGE (ML) INJECTION EVERY 8 HOURS
Status: DISCONTINUED | OUTPATIENT
Start: 2019-04-08 | End: 2019-04-09 | Stop reason: HOSPADM

## 2019-04-08 RX ORDER — SODIUM CHLORIDE 9 MG/ML
100 INJECTION, SOLUTION INTRAVENOUS CONTINUOUS
Status: DISPENSED | OUTPATIENT
Start: 2019-04-08 | End: 2019-04-08

## 2019-04-08 RX ORDER — PROPOFOL 10 MG/ML
INJECTION, EMULSION INTRAVENOUS AS NEEDED
Status: DISCONTINUED | OUTPATIENT
Start: 2019-04-08 | End: 2019-04-08 | Stop reason: HOSPADM

## 2019-04-08 RX ORDER — DEXTROMETHORPHAN/PSEUDOEPHED 2.5-7.5/.8
1.2 DROPS ORAL
Status: DISCONTINUED | OUTPATIENT
Start: 2019-04-08 | End: 2019-04-08 | Stop reason: HOSPADM

## 2019-04-08 RX ORDER — BALSAM PERU/CASTOR OIL
OINTMENT (GRAM) TOPICAL 2 TIMES DAILY
Status: DISCONTINUED | OUTPATIENT
Start: 2019-04-08 | End: 2019-04-09 | Stop reason: HOSPADM

## 2019-04-08 RX ORDER — MIDAZOLAM HYDROCHLORIDE 1 MG/ML
.25-1 INJECTION, SOLUTION INTRAMUSCULAR; INTRAVENOUS
Status: DISCONTINUED | OUTPATIENT
Start: 2019-04-08 | End: 2019-04-08 | Stop reason: HOSPADM

## 2019-04-08 RX ORDER — SODIUM CHLORIDE 0.9 % (FLUSH) 0.9 %
5-40 SYRINGE (ML) INJECTION AS NEEDED
Status: DISCONTINUED | OUTPATIENT
Start: 2019-04-08 | End: 2019-04-09 | Stop reason: HOSPADM

## 2019-04-08 RX ADMIN — METRONIDAZOLE 500 MG: 500 INJECTION, SOLUTION INTRAVENOUS at 07:12

## 2019-04-08 RX ADMIN — PROPOFOL 20 MG: 10 INJECTION, EMULSION INTRAVENOUS at 16:09

## 2019-04-08 RX ADMIN — PROPOFOL 20 MG: 10 INJECTION, EMULSION INTRAVENOUS at 16:22

## 2019-04-08 RX ADMIN — PANTOPRAZOLE SODIUM 40 MG: 40 TABLET, DELAYED RELEASE ORAL at 07:12

## 2019-04-08 RX ADMIN — CEFTRIAXONE 1 G: 1 INJECTION, POWDER, FOR SOLUTION INTRAMUSCULAR; INTRAVENOUS at 19:17

## 2019-04-08 RX ADMIN — ASPIRIN 81 MG CHEWABLE TABLET 81 MG: 81 TABLET CHEWABLE at 08:25

## 2019-04-08 RX ADMIN — AMLODIPINE BESYLATE 10 MG: 5 TABLET ORAL at 08:25

## 2019-04-08 RX ADMIN — SODIUM CHLORIDE: 9 INJECTION, SOLUTION INTRAVENOUS at 15:23

## 2019-04-08 RX ADMIN — AZITHROMYCIN MONOHYDRATE 500 MG: 500 INJECTION, POWDER, LYOPHILIZED, FOR SOLUTION INTRAVENOUS at 09:30

## 2019-04-08 RX ADMIN — ENOXAPARIN SODIUM 30 MG: 30 INJECTION SUBCUTANEOUS at 13:55

## 2019-04-08 RX ADMIN — ESCITALOPRAM OXALATE 10 MG: 10 TABLET ORAL at 08:25

## 2019-04-08 RX ADMIN — Medication 10 ML: at 22:00

## 2019-04-08 RX ADMIN — PROPOFOL 20 MG: 10 INJECTION, EMULSION INTRAVENOUS at 16:02

## 2019-04-08 RX ADMIN — OXYBUTYNIN CHLORIDE 5 MG: 5 TABLET ORAL at 22:00

## 2019-04-08 RX ADMIN — LEVOTHYROXINE SODIUM 50 MCG: 50 TABLET ORAL at 07:12

## 2019-04-08 RX ADMIN — PROPOFOL 20 MG: 10 INJECTION, EMULSION INTRAVENOUS at 16:05

## 2019-04-08 RX ADMIN — LIDOCAINE HYDROCHLORIDE 40 MG: 20 INJECTION, SOLUTION EPIDURAL; INFILTRATION; INTRACAUDAL; PERINEURAL at 15:59

## 2019-04-08 RX ADMIN — LOSARTAN POTASSIUM 100 MG: 50 TABLET ORAL at 08:25

## 2019-04-08 RX ADMIN — PROPOFOL 20 MG: 10 INJECTION, EMULSION INTRAVENOUS at 16:18

## 2019-04-08 RX ADMIN — ATORVASTATIN CALCIUM 40 MG: 40 TABLET, FILM COATED ORAL at 22:00

## 2019-04-08 RX ADMIN — SODIUM CHLORIDE AND POTASSIUM CHLORIDE: 9; 1.49 INJECTION, SOLUTION INTRAVENOUS at 08:50

## 2019-04-08 RX ADMIN — PROPOFOL 20 MG: 10 INJECTION, EMULSION INTRAVENOUS at 16:14

## 2019-04-08 RX ADMIN — PROPOFOL 20 MG: 10 INJECTION, EMULSION INTRAVENOUS at 16:26

## 2019-04-08 RX ADMIN — METRONIDAZOLE 500 MG: 500 INJECTION, SOLUTION INTRAVENOUS at 19:00

## 2019-04-08 RX ADMIN — Medication 10 ML: at 13:55

## 2019-04-08 RX ADMIN — TEMAZEPAM 15 MG: 15 CAPSULE ORAL at 22:00

## 2019-04-08 RX ADMIN — PROPOFOL 50 MG: 10 INJECTION, EMULSION INTRAVENOUS at 15:59

## 2019-04-08 NOTE — ANESTHESIA POSTPROCEDURE EVALUATION
Post-Anesthesia Evaluation and Assessment Patient: Neo Forman MRN: [id number]  SSN: [id number] YOB: 1924  Age: 80 y.o. Sex: female I have evaluated the patient and they are stable and ready for discharge from the PACU. Cardiovascular Function/Vital Signs Visit Vitals /44 Pulse 66 Temp 36.5 °C (97.7 °F) Resp 22 Ht 5' (1.524 m) Wt 53.6 kg (118 lb 2.7 oz) SpO2 95% BMI 23.08 kg/m² Patient is status post MAC anesthesia for Procedure(s): ENDOSCOPIC POLYPECTOMY 
COLONOSCOPY. Nausea/Vomiting: None Postoperative hydration reviewed and adequate. Pain: 
Pain Scale 1: Numeric (0 - 10) (04/08/19 1706) Pain Intensity 1: 0 (04/08/19 1706) Managed Neurological Status:  
Neuro Neurologic State: Alert (04/08/19 0825) Orientation Level: Oriented X4 (04/08/19 0825) Cognition: Follows commands (04/08/19 0825) Speech: Clear (04/08/19 0825) LUE Motor Response: Weak (04/08/19 0825) LLE Motor Response: Weak (04/08/19 0825) RUE Motor Response: Weak (04/08/19 0825) RLE Motor Response: Weak (04/08/19 0825) At baseline Mental Status, Level of Consciousness: Alert and  oriented to person, place, and time Pulmonary Status:  
O2 Device: Room air (04/08/19 1706) Adequate oxygenation and airway patent Complications related to anesthesia: None Post-anesthesia assessment completed. No concerns Signed By: Tangela Polanco MD   
 April 8, 2019 Procedure(s): ENDOSCOPIC POLYPECTOMY 
COLONOSCOPY. MAC 
 
<BSHSIANPOST> Vitals Value Taken Time /53 4/8/2019  5:16 PM  
Temp 36.5 °C (97.7 °F) 4/8/2019  4:56 PM  
Pulse 60 4/8/2019  5:17 PM  
Resp 25 4/8/2019  5:17 PM  
SpO2 96 % 4/8/2019  5:17 PM  
Vitals shown include unvalidated device data.

## 2019-04-08 NOTE — PERIOP NOTES

## 2019-04-08 NOTE — PROGRESS NOTES
Tricia CmHamiltonmarlyn [place] Admit Date: 4/3/2019 Subjective:  
 
Events noted. No new complaints. Current Facility-Administered Medications Medication Dose Route Frequency  0.9% sodium chloride with KCl 20 mEq/L infusion   IntraVENous CONTINUOUS  
 azithromycin (ZITHROMAX) 500 mg in 0.9% sodium chloride (MBP/ADV) 250 mL  500 mg IntraVENous Q24H  
 ondansetron (ZOFRAN) injection 4 mg  4 mg IntraVENous Q6H PRN  
 cloNIDine (CATAPRES) 0.3 mg/24 hr patch 1 Patch  1 Patch TransDERmal Q7D  
 hydrALAZINE (APRESOLINE) 20 mg/mL injection 10 mg  10 mg IntraVENous Q6H PRN  
 amLODIPine (NORVASC) tablet 10 mg  10 mg Oral DAILY  enoxaparin (LOVENOX) injection 30 mg  30 mg SubCUTAneous Q24H  
 LORazepam (ATIVAN) injection 0.5 mg  0.5 mg IntraVENous Q4H PRN  
 metroNIDAZOLE (FLAGYL) IVPB premix 500 mg  500 mg IntraVENous Q12H  
 atorvastatin (LIPITOR) tablet 40 mg  40 mg Oral QHS  aspirin chewable tablet 81 mg  81 mg Oral DAILY  escitalopram oxalate (LEXAPRO) tablet 10 mg  10 mg Oral DAILY  levothyroxine (SYNTHROID) tablet 50 mcg  50 mcg Oral ACB  losartan (COZAAR) tablet 100 mg  100 mg Oral DAILY  pantoprazole (PROTONIX) tablet 40 mg  40 mg Oral ACB  oxybutynin (DITROPAN) tablet 5 mg  5 mg Oral QHS  temazepam (RESTORIL) capsule 15 mg  15 mg Oral QHS  sodium chloride (NS) flush 5-40 mL  5-40 mL IntraVENous Q8H  
 sodium chloride (NS) flush 5-40 mL  5-40 mL IntraVENous PRN  
 cefTRIAXone (ROCEPHIN) 1 g in 0.9% sodium chloride (MBP/ADV) 50 mL  1 g IntraVENous Q24H Objective:  
 
No data found. No intake/output data recorded. 04/06 1901 - 04/08 0700 In: -  
Out: 400 [Urine:400] Physical Exam: NAD. A&O. Neck -- Supple. No JVD. Heart -- RRR. Lungs -- Grossly CTA. Abd -- Soft. Mild diffuse tenderness without R/G.  BS present. Ext -- No LE edema, b/l. Data Review Recent Results (from the past 24 hour(s)) CBC WITH AUTOMATED DIFF Collection Time: 04/07/19 10:25 AM  
Result Value Ref Range WBC 11.2 (H) 3.6 - 11.0 K/uL  
 RBC 4.40 3.80 - 5.20 M/uL  
 HGB 11.3 (L) 11.5 - 16.0 g/dL HCT 36.5 35.0 - 47.0 % MCV 83.0 80.0 - 99.0 FL  
 MCH 25.7 (L) 26.0 - 34.0 PG  
 MCHC 31.0 30.0 - 36.5 g/dL  
 RDW 16.8 (H) 11.5 - 14.5 % PLATELET 879 065 - 046 K/uL MPV 9.9 8.9 - 12.9 FL  
 NRBC 0.0 0  WBC ABSOLUTE NRBC 0.00 0.00 - 0.01 K/uL NEUTROPHILS 82 (H) 32 - 75 % LYMPHOCYTES 3 (L) 12 - 49 % MONOCYTES 14 (H) 5 - 13 % EOSINOPHILS 0 0 - 7 % BASOPHILS 0 0 - 1 % IMMATURE GRANULOCYTES 0 0.0 - 0.5 % ABS. NEUTROPHILS 9.2 (H) 1.8 - 8.0 K/UL  
 ABS. LYMPHOCYTES 0.4 (L) 0.8 - 3.5 K/UL  
 ABS. MONOCYTES 1.6 (H) 0.0 - 1.0 K/UL  
 ABS. EOSINOPHILS 0.0 0.0 - 0.4 K/UL  
 ABS. BASOPHILS 0.0 0.0 - 0.1 K/UL  
 ABS. IMM. GRANS. 0.0 0.00 - 0.04 K/UL  
 DF AUTOMATED METABOLIC PANEL, BASIC Collection Time: 04/07/19 10:25 AM  
Result Value Ref Range Sodium 138 136 - 145 mmol/L Potassium 3.6 3.5 - 5.1 mmol/L Chloride 107 97 - 108 mmol/L  
 CO2 21 21 - 32 mmol/L Anion gap 10 5 - 15 mmol/L Glucose 96 65 - 100 mg/dL BUN 49 (H) 6 - 20 MG/DL Creatinine 0.94 0.55 - 1.02 MG/DL  
 BUN/Creatinine ratio 52 (H) 12 - 20 GFR est AA >60 >60 ml/min/1.73m2 GFR est non-AA 55 (L) >60 ml/min/1.73m2 Calcium 8.9 8.5 - 10.1 MG/DL Assessment:  
 
Principal Problem: 
  Abdominal pain -- ?assoc with sigmoid stricture, possible diverticulitis, etc..... Active Problems: 
  Essential hypertension (9/4/2015) Possible sigmoid stricture -- ?malignancy. Pneumonia due to infectious organism (4/8/2019) Plan: 1. I had a long conversation with pt and POA Katheren Goodpasture) on phone. I explained the possibility of malignancy in the sigmoid.   While pt would not be a candidate for aggressive treatment for cancer, doing a flex sig would help guide further plans/goals of care -- If cancer present, then hospice/comfort care would be best.  If not, then could try to work on strengthening, etc. 
2. Add Zithromax for suspected PNA. 3. Dispo -- no matter what, she will need to go to [facility] from here (at least temporarily). D/w Mignon Lara -- [phone].  
 
 
 
Ervin Kong MD

## 2019-04-08 NOTE — PERIOP NOTES
TRANSFER - IN REPORT: 
 
Verbal report received from bisi PATRICK on [address]  being received from [phone] for ordered procedure Report consisted of patients Situation, Background, Assessment and  
Recommendations(SBAR). Information from the following report(s) SBAR was reviewed with the receiving nurse. Opportunity for questions and clarification was provided. Assessment completed upon patients arrival to unit and care assumed. TRANSFER - OUT REPORT: 
 
Verbal report given to bisi on [address]  being transferred to [facility] for routine progression of care Report consisted of patients Situation, Background, Assessment and  
Recommendations(SBAR). Information from the following report(s) Procedure Summary was reviewed with the receiving nurse. Lines:  
Peripheral IV 04/04/19 Anterior; Left Forearm (Active) Site Assessment Clean, dry, & intact 4/8/2019  8:25 AM  
Phlebitis Assessment 0 4/8/2019  8:25 AM  
Infiltration Assessment 0 4/8/2019  8:25 AM  
Dressing Status Clean, dry, & intact 4/8/2019  8:25 AM  
Dressing Type Transparent;Tape 4/8/2019  8:25 AM  
Hub Color/Line Status Infusing 4/8/2019  8:25 AM  
Action Taken Open ports on tubing capped 4/8/2019  8:25 AM  
Alcohol Cap Used Yes 4/8/2019  8:25 AM  
   
Peripheral IV 04/05/19 Anterior;Proximal;Right Forearm (Active) Site Assessment Clean, dry, & intact 4/8/2019  8:25 AM  
Phlebitis Assessment 0 4/8/2019  8:25 AM  
Infiltration Assessment 0 4/8/2019  8:25 AM  
Dressing Status Clean, dry, & intact 4/8/2019  8:25 AM  
Dressing Type Transparent;Tape 4/8/2019  8:25 AM  
Hub Color/Line Status Infusing 4/8/2019  8:25 AM  
Action Taken Open ports on tubing capped 4/8/2019  8:25 AM  
Alcohol Cap Used Yes 4/8/2019  8:25 AM  
  
 
Opportunity for questions and clarification was provided. Patient transported with: 
 [employer]

## 2019-04-08 NOTE — PROGRESS NOTES
Alis Avilez [facility] 
[address] [facility], [address] GI PROGRESS NOTE Micheal Tejada, [facility] office [phone] NP in-hospital cell phone M-F until 4:30 After 5pm or on weekends, please call  for physician on call NAME: Marina Hurst :  [phone] MRN:  [id number] Subjective: She reports mild generalized abdominal discomfort. Discussed with [place], RN - she's been having loose stools and decreased appetite over the weekend. Objective: VITALS:  
Last 24hrs VS reviewed since prior progress note. Most recent are: 
Visit Vitals /62 Pulse 80 Temp 98.8 °F (37.1 °C) Resp 16 Ht 5' (1.524 m) Wt 53.6 kg (118 lb 2.7 oz) SpO2 94% BMI 23.08 kg/m² PHYSICAL EXAM: 
General: Cooperative, no acute distress   
Neurologic:  Alert HEENT: EOMI, no scleral icterus Lungs:  CTA bilaterally Heart:  S1 S2 Abdomen: Soft, non-distended, mild generalized tenderness. +Bowel sounds Extremities: warm Psych:   Fair insight. Not anxious or agitated. Lab Data Reviewed:  
 
Recent Results (from the past 24 hour(s)) CBC WITH AUTOMATED DIFF Collection Time: 19 10:25 AM  
Result Value Ref Range WBC 11.2 (H) 3.6 - 11.0 K/uL  
 RBC 4.40 3.80 - 5.20 M/uL  
 HGB 11.3 (L) 11.5 - 16.0 g/dL HCT 36.5 35.0 - 47.0 % MCV 83.0 80.0 - 99.0 FL  
 MCH 25.7 (L) 26.0 - 34.0 PG  
 MCHC 31.0 30.0 - 36.5 g/dL  
 RDW 16.8 (H) 11.5 - 14.5 % PLATELET 765 290 - 402 K/uL MPV 9.9 8.9 - 12.9 FL  
 NRBC 0.0 0  WBC ABSOLUTE NRBC 0.00 0.00 - 0.01 K/uL NEUTROPHILS 82 (H) 32 - 75 % LYMPHOCYTES 3 (L) 12 - 49 % MONOCYTES 14 (H) 5 - 13 % EOSINOPHILS 0 0 - 7 % BASOPHILS 0 0 - 1 % IMMATURE GRANULOCYTES 0 0.0 - 0.5 % ABS. NEUTROPHILS 9.2 (H) 1.8 - 8.0 K/UL  
 ABS. LYMPHOCYTES 0.4 (L) 0.8 - 3.5 K/UL  
 ABS. MONOCYTES 1.6 (H) 0.0 - 1.0 K/UL  
 ABS. EOSINOPHILS 0.0 0.0 - 0.4 K/UL  
 ABS. BASOPHILS 0.0 0.0 - 0.1 K/UL ABS. IMM. GRANS. 0.0 0.00 - 0.04 K/UL  
 DF AUTOMATED METABOLIC PANEL, BASIC Collection Time: 04/07/19 10:25 AM  
Result Value Ref Range Sodium 138 136 - 145 mmol/L Potassium 3.6 3.5 - 5.1 mmol/L Chloride 107 97 - 108 mmol/L  
 CO2 21 21 - 32 mmol/L Anion gap 10 5 - 15 mmol/L Glucose 96 65 - 100 mg/dL BUN 49 (H) 6 - 20 MG/DL Creatinine 0.94 0.55 - 1.02 MG/DL  
 BUN/Creatinine ratio 52 (H) 12 - 20 GFR est AA >60 >60 ml/min/1.73m2 GFR est non-AA 55 (L) >60 ml/min/1.73m2 Calcium 8.9 8.5 - 10.1 MG/DL Assessment: · Abdominal pain: WBC 11.2, Hgb 11.3, platelets 833; CT abdomen/pelvis with IV contrast (4/3/19): moderate ascites; diffuse small bowel dilatation without a discrete transition; no direct evidence for acute diverticulitis or colitis; however, there is a focal segmental narrowing in the sigmoid colon which should be evaluated and correlated with colonoscopy Patient Active Problem List  
Diagnosis Code  GERD (gastroesophageal reflux disease) K21.9  Elevated lipids E78.5  Post herpetic neuralgia B02.29  
 Encounter for long-term (current) drug use Z79.899  
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  
· NPO 
· Enemas · Plan for flex sig with Dr. Arina Pierce this afternoon Signed By: Faye Plasencia NP   
 4/8/2019  10:02 AM

## 2019-04-08 NOTE — WOUND CARE
WOCN Note:  
 
New consult for sacrum, head, and heels. Chart shows: 
Admitted for pneumonia, abdominal pain with sigmoid stricture with question of cancer From [facility]. Assessment: She is communicative, uses the bedside commode and able to turn independently for assessment from supine to left. She is wearing briefs and is sleeping off and on during my visit. Bed: total care Patient reports no pain. Bilateral heel, buttocks, and sacral skin intact but with blanching erythema. Rough, dry texture to bilateral upper buttocks consistent with friction. Heels offloaded with Prevalon boots and Mepilex border applied to sacrum. Back of head and onto upper neck: fully blanching irregular redness  ? stork bite/birth ida? Patient repositioned on left side with pillow between the knees. Recommendations:   
Heels and sacrum: venelex twice daily; Mepilex border to sacrum - change as needed. Prevalon boots Minimize layers of linen/pads under patient to optimize support surface. Turn/reposition approximately every 2 hour Transition of Care: Plan to follow weekly and as needed while admitted to hospital.  
 
ARMANI ReynagaN, RN, [facility] Certified Wound, Ostomy, Continence Nurse 
office [phone] 
pager [phone] or call  to page

## 2019-04-08 NOTE — PROGRESS NOTES
Bedside and Verbal shift change report given to [address] (oncoming nurse) by Amanda Donohue (offgoing nurse). Report included the following information SBAR.

## 2019-04-08 NOTE — PROCEDURES
[address]  [address]        Colonoscopy Operative Report    Janae Delvalle  [id number]  5/8/1924      Procedure Type:   Colonoscopy with polypectomy (snare cautery)     Indications: abnormal CT scan of the sigmoid colon        Pre-operative Diagnosis: see indication above    Post-operative Diagnosis:  See findings below    :  Megan Saldivar. Mp Gamble MD      Referring Provider: Gume Vega MD      Sedation:  MAC anesthesia Propofol      Procedure Details:  After informed consent was obtained with all risks and benefits of procedure explained and preoperative exam completed, the patient was taken to the endoscopy suite and placed in the left lateral decubitus position. Upon sequential sedation as per above, a digital rectal exam was performed demonstrating internal hemorrhoids. The Olympus pediatric videocolonoscope  was inserted in the rectum and carefully advanced to the ascending colon. The quality of preparation was not adequate to rule out lesions > 5 mm in portions of the colon. The colonoscope was slowly withdrawn with careful evaluation between folds. Retroflexion in the rectum was completed . Findings:   Rectum: small internal hemorrhoids  Sigmoid: a fixed, angulated turn was encountered in the sigmoid colon. This was traversed. No mucosal abnormality was seen in this region  Descending Colon: normal  Transverse Colon: normal  Ascending Colon: single 8 mm sessile polyp - removed with hot snare in one piece. Cecum: not intubated  Terminal Ileum: not intubated      Specimen Removed: 1. Ascending colon polyp    Complications: None. EBL:  None. Impression:    1. Fixed, angulated turn in the sigmoid colon without obvious mucosal abnormality  2. Ascending colon polyp - removed  3. Small internal hemorrhoids    Recommendations:  1. Follow up surgical pathology  2. Hold Lovenox  3. High fiber diet  4.  Advance diet as tolerated  5. Will follow    Signed By: Shani Sherwood.  Shi Mariee MD     4/8/2019  4:34 PM

## 2019-04-08 NOTE — ROUTINE PROCESS
Amaurifarida Sagastume 5/8/1924 
[id number] Situation: 
Verbal report received from: NASIMA Leahy Procedure: Procedure(s): ENDOSCOPIC POLYPECTOMY 
COLONOSCOPY RESOLUTION CLIP Background: 
 
Preoperative diagnosis: Abnormal CT Postoperative diagnosis: 1. Ascending colon polyp 2. Abnormal colon CT :  Dr. Angela Del Cid Assistant(s): Endoscopy Technician-1: Candie Nuñez Endoscopy RN-1: Vick Booker Specimens:  
ID Type Source Tests Collected by Time Destination 1 : ascending colon polyp Preservative   Coty García MD 4/8/2019 1619 Pathology H. Pylori  no Assessment: 
Intra-procedure medications Anesthesia gave intra-procedure sedation and medications, see anesthesia flow sheet yes Intravenous fluids: NS@ [facility] Vital signs stable Abdominal assessment: round and soft Recommendation: 
Discharge patient per MD order. Return to floor Family or Friend Permission to share finding with family or friend yes and n/a

## 2019-04-09 VITALS
BODY MASS INDEX: 23.2 KG/M2 | HEART RATE: 67 BPM | WEIGHT: 118.17 LBS | RESPIRATION RATE: 18 BRPM | TEMPERATURE: 97.7 F | HEIGHT: 60 IN | DIASTOLIC BLOOD PRESSURE: 62 MMHG | SYSTOLIC BLOOD PRESSURE: 129 MMHG | OXYGEN SATURATION: 94 %

## 2019-04-09 LAB
ANION GAP SERPL CALC-SCNC: 8 MMOL/L (ref 5–15)
BASOPHILS # BLD: 0 K/UL (ref 0–0.1)
BASOPHILS NFR BLD: 0 % (ref 0–1)
BUN SERPL-MCNC: 40 MG/DL (ref 6–20)
BUN/CREAT SERPL: 53 (ref 12–20)
CALCIUM SERPL-MCNC: 7.5 MG/DL (ref 8.5–10.1)
CHLORIDE SERPL-SCNC: 112 MMOL/L (ref 97–108)
CO2 SERPL-SCNC: 19 MMOL/L (ref 21–32)
CREAT SERPL-MCNC: 0.75 MG/DL (ref 0.55–1.02)
DIFFERENTIAL METHOD BLD: ABNORMAL
EOSINOPHIL # BLD: 0 K/UL (ref 0–0.4)
EOSINOPHIL NFR BLD: 0 % (ref 0–7)
ERYTHROCYTE [DISTWIDTH] IN BLOOD BY AUTOMATED COUNT: 16.7 % (ref 11.5–14.5)
GLUCOSE SERPL-MCNC: 62 MG/DL (ref 65–100)
HCT VFR BLD AUTO: 29.1 % (ref 35–47)
HGB BLD-MCNC: 9.1 G/DL (ref 11.5–16)
IMM GRANULOCYTES # BLD AUTO: 0 K/UL
IMM GRANULOCYTES NFR BLD AUTO: 0 %
LYMPHOCYTES # BLD: 0.3 K/UL (ref 0.8–3.5)
LYMPHOCYTES NFR BLD: 4 % (ref 12–49)
MCH RBC QN AUTO: 26 PG (ref 26–34)
MCHC RBC AUTO-ENTMCNC: 31.3 G/DL (ref 30–36.5)
MCV RBC AUTO: 83.1 FL (ref 80–99)
MONOCYTES # BLD: 0.7 K/UL (ref 0–1)
MONOCYTES NFR BLD: 9 % (ref 5–13)
NEUTS BAND NFR BLD MANUAL: 5 % (ref 0–6)
NEUTS SEG # BLD: 6.3 K/UL (ref 1.8–8)
NEUTS SEG NFR BLD: 82 % (ref 32–75)
NRBC # BLD: 0 K/UL (ref 0–0.01)
NRBC BLD-RTO: 0 PER 100 WBC
PLATELET # BLD AUTO: 253 K/UL (ref 150–400)
PLATELET COMMENTS,PCOM: ABNORMAL
PMV BLD AUTO: 10.3 FL (ref 8.9–12.9)
POTASSIUM SERPL-SCNC: 3 MMOL/L (ref 3.5–5.1)
RBC # BLD AUTO: 3.5 M/UL (ref 3.8–5.2)
RBC MORPH BLD: ABNORMAL
RBC MORPH BLD: ABNORMAL
SODIUM SERPL-SCNC: 139 MMOL/L (ref 136–145)
WBC # BLD AUTO: 7.3 K/UL (ref 3.6–11)

## 2019-04-09 PROCEDURE — 74011250636 HC RX REV CODE- 250/636: Performed by: FAMILY MEDICINE

## 2019-04-09 PROCEDURE — 74011250637 HC RX REV CODE- 250/637: Performed by: FAMILY MEDICINE

## 2019-04-09 PROCEDURE — 36415 COLL VENOUS BLD VENIPUNCTURE: CPT

## 2019-04-09 PROCEDURE — 74011250636 HC RX REV CODE- 250/636: Performed by: INTERNAL MEDICINE

## 2019-04-09 PROCEDURE — 80048 BASIC METABOLIC PNL TOTAL CA: CPT

## 2019-04-09 PROCEDURE — 77010033678 HC OXYGEN DAILY

## 2019-04-09 PROCEDURE — 85025 COMPLETE CBC W/AUTO DIFF WBC: CPT

## 2019-04-09 PROCEDURE — 74011250637 HC RX REV CODE- 250/637: Performed by: INTERNAL MEDICINE

## 2019-04-09 PROCEDURE — 94760 N-INVAS EAR/PLS OXIMETRY 1: CPT

## 2019-04-09 RX ORDER — AMLODIPINE BESYLATE 5 MG/1
5 TABLET ORAL DAILY
Qty: 30 TAB | Refills: 5 | Status: SHIPPED
Start: 2019-04-09

## 2019-04-09 RX ORDER — AZITHROMYCIN 250 MG/1
250 TABLET, FILM COATED ORAL DAILY
Qty: 4 TAB | Refills: 0 | Status: SHIPPED
Start: 2019-04-09 | End: 2019-04-13

## 2019-04-09 RX ORDER — LANOLIN ALCOHOL/MO/W.PET/CERES
400 CREAM (GRAM) TOPICAL DAILY
Qty: 30 TAB | Refills: 5 | Status: SHIPPED
Start: 2019-04-09

## 2019-04-09 RX ORDER — CEFDINIR 300 MG/1
600 CAPSULE ORAL DAILY
Qty: 10 CAP | Refills: 0 | Status: SHIPPED
Start: 2019-04-09 | End: 2019-04-14

## 2019-04-09 RX ADMIN — METRONIDAZOLE 500 MG: 500 INJECTION, SOLUTION INTRAVENOUS at 07:30

## 2019-04-09 RX ADMIN — Medication 10 ML: at 07:30

## 2019-04-09 RX ADMIN — SODIUM CHLORIDE AND POTASSIUM CHLORIDE: 9; 1.49 INJECTION, SOLUTION INTRAVENOUS at 06:45

## 2019-04-09 RX ADMIN — LOSARTAN POTASSIUM 100 MG: 50 TABLET ORAL at 09:14

## 2019-04-09 RX ADMIN — LEVOTHYROXINE SODIUM 50 MCG: 50 TABLET ORAL at 07:30

## 2019-04-09 RX ADMIN — ESCITALOPRAM OXALATE 10 MG: 10 TABLET ORAL at 09:14

## 2019-04-09 RX ADMIN — ASPIRIN 81 MG CHEWABLE TABLET 81 MG: 81 TABLET CHEWABLE at 09:14

## 2019-04-09 RX ADMIN — PANTOPRAZOLE SODIUM 40 MG: 40 TABLET, DELAYED RELEASE ORAL at 07:29

## 2019-04-09 RX ADMIN — AMLODIPINE BESYLATE 10 MG: 5 TABLET ORAL at 09:13

## 2019-04-09 RX ADMIN — CASTOR OIL AND BALSAM, PERU: 788; 87 OINTMENT TOPICAL at 09:15

## 2019-04-09 RX ADMIN — AZITHROMYCIN MONOHYDRATE 500 MG: 500 INJECTION, POWDER, LYOPHILIZED, FOR SOLUTION INTRAVENOUS at 09:21

## 2019-04-09 NOTE — PROGRESS NOTES
Patient has been cleared for discharge and will be admitted to [facility]. H&P and D/C summary faxed to [phone]. Nursing to call report to [phone]. Care Management Interventions PCP Verified by CM: Yes(Dr. Nazia Burr) Palliative Care Criteria Met (RRAT>21 & CHF Dx)?: No 
Transition of Care Consult (CM Consult): Assisted Living([facility]) MyChart Signup: No 
Discharge Durable Medical Equipment: No 
Health Maintenance Reviewed: Yes Physical Therapy Consult: No 
Occupational Therapy Consult: No 
Speech Therapy Consult: No 
Current Support Network: Other(Independent Living - [facility], sees PCP in clinic) Plan discussed with Pt/Family/Caregiver: Yes Freedom of Choice Offered: Yes Discharge Location Discharge Placement: Skilled nursing facility [phone]

## 2019-04-09 NOTE — DISCHARGE INSTRUCTIONS
Patient Discharge Instructions    Pia Ponce / [id number] : 1924    Admitted 4/3/2019 Discharged: 2019     Take Home Medications       · It is important that you take the medication exactly as they are prescribed. · Keep your medication in the bottles provided by the pharmacist and keep a list of the medication names, dosages, and times to be taken in your wallet. · Do not take other medications without consulting your doctor. What to do at Home    Recommended diet: Regular Diet. Recommended activity: Activity as tolerated and PT/OT Eval and Treat. Repeat Chest Xray in 1 month. DNR. Follow-up with Dr. Arabella Shaw after discharge from [facility]. Information obtained by :  I understand that if any problems occur once I am at home I am to contact my physician. I understand and acknowledge receipt of the instructions indicated above.                                                                                                                                            Physician's or R.N.'s Signature                                                                  Date/Time                                                                                                                                              Patient or Representative Signature                                                          Date/Time

## 2019-04-09 NOTE — PROGRESS NOTES
Flex sig results noted -- sessile polyp, but no hortensia masses. Pt feels better, and she wants to be discharged -- Transfer to [facility] today. Will cont a few more days of abx for possible PNA (Ceftin & Azithromycin). Stopping Flagyl, as there was no real evidence of diverticulitis. Plan repeat CXR in about a month. D/w POA, Ova Colace.

## 2019-04-09 NOTE — PROGRESS NOTES
Discharge instructions and medication information discussed with pt. Opportunity for questions provided. Peripheral IV removed without complication. Pt belongings packed and sent with pt. Pt transported via stretcher to [facility]. Pt stable and in no acute distress at time of discharge. 1315: Nurse called report to Chad Galindo at facility.

## 2019-04-09 NOTE — PROGRESS NOTES
Bedside shift change report given to [address] [place] [address] [place] (oncoming nurse) by Evan Regan RN (offgoing nurse). Report included the following information SBAR, Kardex, Procedure Summary and MAR.

## 2019-04-09 NOTE — DISCHARGE SUMMARY
Physician Discharge Summary     Patient ID:  Sammi Sagastume  [id number]  47 y.o.  5/8/1924    Admit date: 4/3/2019    Discharge date and time: 4/9/2019    Briefly, pt was admitted with Abdominal pain [R10.9]. For details of admission, see H&P. Hospital Course:  Pt was admitted with abd pain & leukocytosis. Abd CT had suggested a possible sigmoid mass. She was placed on IV abx (Flagyl & Rocephin) for possible diverticulitis? There was question of possible PNA, so Azithromycin was added. She clinically improved. She has had some recent weight loss. Considering this and the possible sigmoid mass on CT, it was decided to do a flex sig to help guide further care -- was unremarkable except for a sessile polyp (pathology pending at this time). Pt is too weak to [place] back [place] at this [place], so she is going to [facility] for PT, etc.  A CXR should be repeated in a month. Discharge Dx: abd pain, unclear etiology; Possible PNA. Condition at discharge: improved. Disposition: SNF    Patient Instructions:   Current Discharge Medication List      START taking these medications    Details   cefdinir (OMNICEF) 300 mg capsule Take 2 Caps by mouth daily for 5 days. Qty: 10 Cap, Refills: 0      azithromycin (ZITHROMAX) 250 mg tablet Take 1 Tab by mouth daily for 4 days. Qty: 4 Tab, Refills: 0         CONTINUE these medications which have CHANGED    Details   amLODIPine (NORVASC) 5 mg tablet Take 1 Tab by mouth daily. Qty: 30 Tab, Refills: 5    Associated Diagnoses: Essential hypertension; Edema, unspecified type      folic acid 324 mcg tablet Take 1 Tab by mouth daily. Qty: 30 Tab, Refills: 5         CONTINUE these medications which have NOT CHANGED    Details   cloNIDine (CATAPRES) 0.1 mg/24 hr ptwk APPLY 1 PATCH TOPICALLY. CHANGE WEEKLY  Qty: 4 Patch, Refills: 11      levothyroxine (SYNTHROID) 50 mcg tablet Take 1 Tab by mouth Daily (before breakfast).   Qty: 30 Tab, Refills: 11 temazepam (RESTORIL) 15 mg capsule TAKE ONE (1) CAPSULE BY MOUTH AT BEDTIME AS NEEDED FOR INSOMNIA. Qty: 60 Cap, Refills: 3    Comments: This request is for a new prescription for a controlled substance as required by Federal/State law. .      losartan (COZAAR) 100 mg tablet Take 100 mg by mouth daily. omeprazole (PRILOSEC) 40 mg capsule Take 40 mg by mouth daily. atorvastatin (LIPITOR) 40 mg tablet TAKE ONE (1) TABLET BY MOUTH AT BEDTIME. Qty: 90 Tab, Refills: 3      escitalopram oxalate (LEXAPRO) 10 mg tablet TAKE ONE (1) TABLET BY MOUTH EVERY DAY. Titi Binder: 30 Tab, Refills: 11      oxybutynin (DITROPAN) 5 mg tablet TAKE ONE (1) TABLET BY MOUTH AT BEDTIME. Qty: 90 Tab, Refills: 3      aspirin 81 mg chewable tablet Take 81 mg by mouth daily. vit A/vit C/vit E/zinc/copper (PRESERVISION AREDS PO) Take  by mouth daily. hydroCHLOROthiazide (MICROZIDE) 12.5 mg capsule TAKE ONE (1) CAPSULE BY MOUTH EVERY DAY. Qty: 30 Cap, Refills: 11             Follow-up with Dr. Tammy Hernandez after discharge from [facility].         Signed:  Tomás Duron MD  4/9/2019  7:08 AM

## 2019-04-09 NOTE — PROGRESS NOTES
Bedside shift change report given to [address] [place] [address] [address] (oncoming nurse) by Solo Kulkarni (offgoing nurse). Report included the following information SBAR, Kardex and MAR.

## 2019-10-20 ENCOUNTER — HOSPITAL ENCOUNTER (EMERGENCY)
Age: 84
Discharge: HOME | End: 2019-10-20
Attending: EMERGENCY MEDICINE | Admitting: EMERGENCY MEDICINE
Payer: MEDICARE

## 2019-10-20 VITALS
RESPIRATION RATE: 17 BRPM | HEART RATE: 52 BPM | OXYGEN SATURATION: 96 % | DIASTOLIC BLOOD PRESSURE: 42 MMHG | SYSTOLIC BLOOD PRESSURE: 163 MMHG

## 2019-10-20 DIAGNOSIS — R07.89 ATYPICAL CHEST PAIN: Primary | ICD-10-CM

## 2019-10-20 LAB
ALBUMIN SERPL-MCNC: 2.9 G/DL (ref 3.5–5)
ALBUMIN/GLOB SERPL: 0.8 {RATIO} (ref 1.1–2.2)
ALP SERPL-CCNC: 144 U/L (ref 45–117)
ALT SERPL-CCNC: 11 U/L (ref 12–78)
ANION GAP SERPL CALC-SCNC: 6 MMOL/L (ref 5–15)
AST SERPL-CCNC: 14 U/L (ref 15–37)
ATRIAL RATE: 357 BPM
BASOPHILS # BLD: 0.1 K/UL (ref 0–0.1)
BASOPHILS NFR BLD: 1 % (ref 0–1)
BILIRUB SERPL-MCNC: 0.4 MG/DL (ref 0.2–1)
BUN SERPL-MCNC: 19 MG/DL (ref 6–20)
BUN/CREAT SERPL: 22 (ref 12–20)
CALCIUM SERPL-MCNC: 8.6 MG/DL (ref 8.5–10.1)
CALCULATED R AXIS, ECG10: -54 DEGREES
CALCULATED T AXIS, ECG11: 7 DEGREES
CHLORIDE SERPL-SCNC: 108 MMOL/L (ref 97–108)
CO2 SERPL-SCNC: 28 MMOL/L (ref 21–32)
COMMENT, HOLDF: NORMAL
CREAT SERPL-MCNC: 0.85 MG/DL (ref 0.55–1.02)
DIAGNOSIS, 93000: NORMAL
DIFFERENTIAL METHOD BLD: ABNORMAL
EOSINOPHIL # BLD: 0.4 K/UL (ref 0–0.4)
EOSINOPHIL NFR BLD: 5 % (ref 0–7)
ERYTHROCYTE [DISTWIDTH] IN BLOOD BY AUTOMATED COUNT: 17.2 % (ref 11.5–14.5)
GLOBULIN SER CALC-MCNC: 3.5 G/DL (ref 2–4)
GLUCOSE SERPL-MCNC: 90 MG/DL (ref 65–100)
HCT VFR BLD AUTO: 30.8 % (ref 35–47)
HGB BLD-MCNC: 9.3 G/DL (ref 11.5–16)
IMM GRANULOCYTES # BLD AUTO: 0 K/UL (ref 0–0.04)
IMM GRANULOCYTES NFR BLD AUTO: 0 % (ref 0–0.5)
LYMPHOCYTES # BLD: 0.9 K/UL (ref 0.8–3.5)
LYMPHOCYTES NFR BLD: 12 % (ref 12–49)
MAGNESIUM SERPL-MCNC: 2.1 MG/DL (ref 1.6–2.4)
MAGNESIUM SERPL-MCNC: 2.1 MG/DL (ref 1.6–2.4)
MCH RBC QN AUTO: 24.9 PG (ref 26–34)
MCHC RBC AUTO-ENTMCNC: 30.2 G/DL (ref 30–36.5)
MCV RBC AUTO: 82.6 FL (ref 80–99)
MONOCYTES # BLD: 0.9 K/UL (ref 0–1)
MONOCYTES NFR BLD: 12 % (ref 5–13)
NEUTS SEG # BLD: 5.1 K/UL (ref 1.8–8)
NEUTS SEG NFR BLD: 70 % (ref 32–75)
NRBC # BLD: 0 K/UL (ref 0–0.01)
NRBC BLD-RTO: 0 PER 100 WBC
PLATELET # BLD AUTO: 245 K/UL (ref 150–400)
PMV BLD AUTO: 9.5 FL (ref 8.9–12.9)
POTASSIUM SERPL-SCNC: 4 MMOL/L (ref 3.5–5.1)
PROT SERPL-MCNC: 6.4 G/DL (ref 6.4–8.2)
Q-T INTERVAL, ECG07: 424 MS
QRS DURATION, ECG06: 102 MS
QTC CALCULATION (BEZET), ECG08: 433 MS
RBC # BLD AUTO: 3.73 M/UL (ref 3.8–5.2)
SAMPLES BEING HELD,HOLD: NORMAL
SODIUM SERPL-SCNC: 142 MMOL/L (ref 136–145)
TROPONIN I BLD-MCNC: <0.04 NG/ML (ref 0–0.08)
TROPONIN I SERPL-MCNC: <0.05 NG/ML
VENTRICULAR RATE, ECG03: 63 BPM
WBC # BLD AUTO: 7.2 K/UL (ref 3.6–11)

## 2019-10-20 PROCEDURE — 84484 ASSAY OF TROPONIN QUANT: CPT

## 2019-10-20 PROCEDURE — 80053 COMPREHEN METABOLIC PANEL: CPT

## 2019-10-20 PROCEDURE — 85025 COMPLETE CBC W/AUTO DIFF WBC: CPT

## 2019-10-20 PROCEDURE — 83735 ASSAY OF MAGNESIUM: CPT

## 2019-10-20 PROCEDURE — 36415 COLL VENOUS BLD VENIPUNCTURE: CPT

## 2019-10-20 PROCEDURE — 74011250637 HC RX REV CODE- 250/637: Performed by: EMERGENCY MEDICINE

## 2019-10-20 PROCEDURE — 99285 EMERGENCY DEPT VISIT HI MDM: CPT

## 2019-10-20 PROCEDURE — 93005 ELECTROCARDIOGRAM TRACING: CPT

## 2019-10-20 RX ORDER — AMLODIPINE BESYLATE 5 MG/1
5 TABLET ORAL
Status: COMPLETED | OUTPATIENT
Start: 2019-10-20 | End: 2019-10-20

## 2019-10-20 RX ORDER — LOSARTAN POTASSIUM 50 MG/1
100 TABLET ORAL
Status: COMPLETED | OUTPATIENT
Start: 2019-10-20 | End: 2019-10-20

## 2019-10-20 RX ADMIN — AMLODIPINE BESYLATE 5 MG: 5 TABLET ORAL at 09:34

## 2019-10-20 RX ADMIN — LOSARTAN POTASSIUM 100 MG: 50 TABLET, FILM COATED ORAL at 09:34

## 2019-10-20 NOTE — ED TRIAGE NOTES
Woke up this morning with chest pressure at [facility] 6/10. Denies nausea but felt weak. /78 per the nurse. Was given 4 baby asa, 1 nitro SL per EMS. Chest pain no longer present per patient. A&ox3, ambulatory at baseline. BS 86.

## 2019-10-20 NOTE — ED PROVIDER NOTES
80 y.o. female with past medical history significant for GERD, PVD, and HTN who presents via EMS from [facility] with chief complaint of chest pain. Per EMS, patient brought in for \"6/10\" mid-sternal chest pressure this morning - given 1 NG and 4-81mg asa en route with relief of symptoms. /78. BG 86. Patient reports awakening with the same this morning with associated generalized weakness - attempted to switch sleeping positions with no relief of symptoms. Patient states, \"I just had a bad feeling in my chest, it was dull but sharp around the edges. \" Patient reports her last PO intake was at dinner last night where she had vegetables, a roll, and coffee, and \"a little bit\" of ice cream prior to bed. Of note, patient has a Hx of HTN for which she takes daily 100mg losartan and 5mg amlodipine - notes staff at her nursing home administer her medications and is unsure if she was given those this morning. No n/v. There are no other acute medical concerns at this time. Social hx: Former tobacco smoker (quit 1/1/1952); Endorses weekly EtOH use (~1 drink/week); Denies illicit drug use  PCP: Holly Cramer MD    Note written by Yordan Kaye, as dictated by Tone Crane MD 9:06 AM           Past Medical History:   Diagnosis Date    Arthritis     HANDS    Chronic pain     UPPER ABDOMEN    Elevated lipids 5/31/2011    Gastrointestinal disease     Acid reflux    Gastrointestinal disorder     Hiatial hernia    GERD (gastroesophageal reflux disease) 5/31/2011    HTN (hypertension) 5/31/2011    Hypothyroidism 2/11/2013    Macular degeneration     Pneumonia     Post herpetic neuralgia 5/31/2011    PVD (peripheral vascular disease) ([facility] [address] 03/2000    Thromboembolus ([facility] [address] 2001    LT.  LEG    Thyroid disease     Unspecified adverse effect of anesthesia     DIFFICULTY AWAKENING, WAS COMBATIVE       Past Surgical History:   Procedure Laterality Date    COLONOSCOPY Left 4/8/2019 COLONOSCOPY performed by Atiya Porter MD at [facility] ENDOSCOPY    HX APPENDECTOMY      HX CARPAL TUNNEL RELEASE  3/2000    HX CATARACT REMOVAL      WILFREDO. W/ LENS IMPLANT    HX CHOLECYSTECTOMY      HX GI      COLONOSCOPY AND ENDOSCOPY    HX GI  12    DaVinci Hiatal Hernia Repair with Mesh and Toupet Fundoplication    HX MOHS PROCEDURES      LT.    HX ORTHOPAEDIC      WILFREDO. CARPAL TUNNEL    HX TONSILLECTOMY           Family History:   Problem Relation Age of Onset    Heart Disease Brother          age 57's    Cancer Brother         LUNG    Heart Disease Brother          age 66's       Social History     Socioeconomic History    Marital status:      Spouse name: Not on file    Number of children: Not on file    Years of education: Not on file    Highest education level: Not on file   Occupational History    Not on file   Social Needs    Financial resource strain: Not on file    Food insecurity:     Worry: Not on file     Inability: Not on file    Transportation needs:     Medical: Not on file     Non-medical: Not on file   Tobacco Use    Smoking status: Former Smoker     Packs/day: 0.50     Years: 7.00     Pack years: 3.50     Last attempt to quit: 1952     Years since quittin.8    Smokeless tobacco: Never Used   Substance and Sexual Activity    Alcohol use:  Yes     Alcohol/week: 1.7 standard drinks     Types: 1 Glasses of wine, 1 Shots of liquor per week    Drug use: No    Sexual activity: Not on file   Lifestyle    Physical activity:     Days per week: Not on file     Minutes per session: Not on file    Stress: Not on file   Relationships    Social connections:     Talks on phone: Not on file     Gets together: Not on file     Attends [employer] service: Not on file     Active member of club or organization: Not on file     Attends meetings of clubs or organizations: Not on file     Relationship status: Not on file    Intimate partner violence:     Fear of current or ex partner: Not on file     Emotionally abused: Not on file     Physically abused: Not on file     Forced sexual activity: Not on file   Other Topics Concern    Not on file   Social History Narrative    Not on file         ALLERGIES: Bactrim [sulfamethoprim ds]; Darvocet a500 [propoxyphene n-acetaminophen]; Other medication; and Pcn [penicillins]    Review of Systems   Cardiovascular: Positive for chest pain (mid-sternal, dull but sharp around the edges). Gastrointestinal: Negative for nausea. Neurological: Positive for weakness (generalized). All other systems reviewed and are negative. Vitals:    10/20/19 1200 10/20/19 1215 10/20/19 1230 10/20/19 1245   BP: 154/45 163/44 162/43 163/42   Pulse: (!) 51 (!) 56 (!) 51 (!) 52   Resp: 18 17 16 17   SpO2: 95% 96% 95% 96%            Physical Exam   Constitutional: She appears well-developed and well-nourished. No distress. HENT:   Head: Normocephalic and atraumatic. Eyes: Conjunctivae are normal.   Neck: Neck supple. Cardiovascular: Normal rate, regular rhythm and normal heart sounds. Pulmonary/Chest: Effort normal and breath sounds normal. No respiratory distress. Abdominal: She exhibits no distension. Musculoskeletal: Normal range of motion. She exhibits no deformity. Neurological: She is alert. No cranial nerve deficit. Skin: Skin is warm and dry. Psychiatric: Her behavior is normal.   Nursing note and vitals reviewed. Note written by Yordan Kaye, as dictated by Tone Crane MD 9:06 AM     MDM     80 y.o. female presents with atypical anterior chest pain that is not well localized described as sharp, with radiating soreness. No radiation to arms, neck, or back. She is well-appearing and is pain-free on arrival here. Provided aspirin by EMS prior to arrival as well as a single nitroglycerin. She is on antihypertensive therapy at home and has not taken that medication today.   This is provided pending work-up. Serial troponins are negative and EKG is nonischemic. She is not interested in admission or intervention if it is avoidable. Will recommend close follow-up with primary care physician for further risk stratification for ACS but currently she is overall low risk with atypical features. Return precautions were discussed for worsening or new concerning symptoms. Procedures    ED EKG interpretation:  Rhythm: normal sinus rhythm with sinus arrhythmia; and regular . Rate (approx.): 63; Axis: left axis deviation; ST/T wave: LVH with 2nd repolarization abnormality.   Note written by Yordan Swartz, as dictated by Cynda Peabody, MD 9:06 AM

## 2020-11-05 PROBLEM — R47.01 EXPRESSIVE APHASIA: Status: ACTIVE | Noted: 2020-11-05

## (undated) DEVICE — REM POLYHESIVE ADULT PATIENT RETURN ELECTRODE: Brand: VALLEYLAB

## (undated) DEVICE — SYRINGE MED 20ML STD CLR PLAS LUERLOCK TIP N CTRL DISP

## (undated) DEVICE — CONNECTOR TBNG AUX H2O JET DISP FOR OLY 160/180 SER

## (undated) DEVICE — Z DISCONTINUED USE 2751540 TUBING IRRIG L10IN DISP PMP ENDOGATOR

## (undated) DEVICE — TRAP SURG QUAD PARABOLA SLOT DSGN SFTY SCRN TRAPEASE

## (undated) DEVICE — NEONATAL-ADULT SPO2 SENSOR: Brand: NELLCOR

## (undated) DEVICE — Z CONVERTED USE 2274299 CUFF BLD PRESSURE LNG MED AD 25-35 CM ARM FLEXIPORT DISP

## (undated) DEVICE — Z DISCONTINUED NO SUB IDED SET EXTN W/ 4 W STPCOCK M SPIN LOK 36IN

## (undated) DEVICE — SYR 50ML SLIP TIP NSAF LF STRL --

## (undated) DEVICE — TUBING O2 PED L13FT NSL ORAL PT SAMP LN NONINTUBATED SMRT

## (undated) DEVICE — SET ADMIN 16ML TBNG L100IN 2 Y INJ SITE IV PIGGY BK DISP

## (undated) DEVICE — AIRLIFE™ U/CONNECT-IT OXYGEN TUBING 7 FEET (2.1 M) CRUSH-RESISTANT OXYGEN TUBING, VINYL TIPPED: Brand: AIRLIFE™

## (undated) DEVICE — SNARE ENDOSCP M L240CM W27MM SHTH DIA2.4MM CHN 2.8MM OVL

## (undated) DEVICE — Device: Brand: MEDICAL ACTION INDUSTRIES

## (undated) DEVICE — NEEDLE HYPO 18GA L1.5IN PNK S STL HUB POLYPR SHLD REG BVL

## (undated) DEVICE — ENDO CARRY-ON PROCEDURE KIT INCLUDES ENZYMATIC SPONGE, GAUZE, BIOHAZARD LABEL, TRAY, LUBRICANT, DIRTY SCOPE LABEL, WATER LABEL, TRAY, DRAWSTRING PAD, AND DEFENDO 4-PIECE KIT.: Brand: ENDO CARRY-ON PROCEDURE KIT

## (undated) DEVICE — BW-412T DISP COMBO CLEANING BRUSH: Brand: SINGLE USE COMBINATION CLEANING BRUSH

## (undated) DEVICE — 1200 GUARD II KIT W/5MM TUBE W/O VAC TUBE: Brand: GUARDIAN

## (undated) DEVICE — KENDALL RADIOLUCENT FOAM MONITORING ELECTRODE -RECTANGULAR SHAPE: Brand: KENDALL

## (undated) DEVICE — BAG SPEC BIOHZD LF 2MIL 6X10IN -- CONVERT TO ITEM 357326

## (undated) DEVICE — BAG BELONG PT PERS CLEAR HANDL

## (undated) DEVICE — CANN NASAL O2 CAPNOGRAPHY AD -- FILTERLINE

## (undated) DEVICE — SOLIDIFIER FLUID 3000 CC ABSORB

## (undated) DEVICE — CATH IV AUTOGRD BC BLU 22GA 25 -- INSYTE

## (undated) DEVICE — QUILTED PREMIUM COMFORT UNDERPAD,EXTRA HEAVY: Brand: WINGS

## (undated) DEVICE — WRISTBAND ID AD W1XL11.5IN RED POLY ALRG PREPRINTED PERM

## (undated) DEVICE — CONTAINER SPEC 20 ML LID NEUT BUFF FORMALIN 10 % POLYPR STS